# Patient Record
Sex: FEMALE | Race: WHITE | NOT HISPANIC OR LATINO | URBAN - METROPOLITAN AREA
[De-identification: names, ages, dates, MRNs, and addresses within clinical notes are randomized per-mention and may not be internally consistent; named-entity substitution may affect disease eponyms.]

---

## 2017-04-11 ENCOUNTER — OFFICE (OUTPATIENT)
Dept: URBAN - METROPOLITAN AREA CLINIC 75 | Facility: CLINIC | Age: 22
End: 2017-04-11
Payer: COMMERCIAL

## 2017-04-11 VITALS
HEIGHT: 71 IN | SYSTOLIC BLOOD PRESSURE: 112 MMHG | HEART RATE: 78 BPM | DIASTOLIC BLOOD PRESSURE: 62 MMHG | WEIGHT: 226 LBS

## 2017-04-11 DIAGNOSIS — K58.9 IRRITABLE BOWEL SYNDROME WITHOUT DIARRHEA: ICD-10-CM

## 2017-04-11 DIAGNOSIS — K21.9 GASTRO-ESOPHAGEAL REFLUX DISEASE WITHOUT ESOPHAGITIS: ICD-10-CM

## 2017-04-11 DIAGNOSIS — R11.2 NAUSEA WITH VOMITING, UNSPECIFIED: ICD-10-CM

## 2017-04-11 PROCEDURE — 99202 OFFICE O/P NEW SF 15 MIN: CPT

## 2017-05-02 VITALS
HEIGHT: 71 IN | HEART RATE: 60 BPM | TEMPERATURE: 96.8 F | HEART RATE: 72 BPM | HEART RATE: 67 BPM | HEART RATE: 62 BPM | OXYGEN SATURATION: 100 % | OXYGEN SATURATION: 99 % | SYSTOLIC BLOOD PRESSURE: 101 MMHG | SYSTOLIC BLOOD PRESSURE: 145 MMHG | TEMPERATURE: 97.8 F | DIASTOLIC BLOOD PRESSURE: 46 MMHG | SYSTOLIC BLOOD PRESSURE: 125 MMHG | SYSTOLIC BLOOD PRESSURE: 136 MMHG | DIASTOLIC BLOOD PRESSURE: 77 MMHG | WEIGHT: 226 LBS | OXYGEN SATURATION: 98 % | RESPIRATION RATE: 21 BRPM | RESPIRATION RATE: 14 BRPM | HEART RATE: 68 BPM | SYSTOLIC BLOOD PRESSURE: 105 MMHG | DIASTOLIC BLOOD PRESSURE: 85 MMHG | DIASTOLIC BLOOD PRESSURE: 72 MMHG | SYSTOLIC BLOOD PRESSURE: 132 MMHG | RESPIRATION RATE: 18 BRPM | DIASTOLIC BLOOD PRESSURE: 87 MMHG | RESPIRATION RATE: 15 BRPM

## 2017-05-03 ENCOUNTER — AMBULATORY SURGICAL CENTER (OUTPATIENT)
Dept: URBAN - METROPOLITAN AREA SURGERY 17 | Facility: SURGERY | Age: 22
End: 2017-05-03
Payer: COMMERCIAL

## 2017-05-03 ENCOUNTER — OFFICE (OUTPATIENT)
Dept: URBAN - METROPOLITAN AREA CLINIC 64 | Facility: CLINIC | Age: 22
End: 2017-05-03
Payer: COMMERCIAL

## 2017-05-03 DIAGNOSIS — K31.89 OTHER DISEASES OF STOMACH AND DUODENUM: ICD-10-CM

## 2017-05-03 DIAGNOSIS — K31.9 DISEASE OF STOMACH AND DUODENUM, UNSPECIFIED: ICD-10-CM

## 2017-05-03 DIAGNOSIS — K21.0 GASTRO-ESOPHAGEAL REFLUX DISEASE WITH ESOPHAGITIS: ICD-10-CM

## 2017-05-03 DIAGNOSIS — K29.50 UNSPECIFIED CHRONIC GASTRITIS WITHOUT BLEEDING: ICD-10-CM

## 2017-05-03 DIAGNOSIS — T18.2XXA FOREIGN BODY IN STOMACH, INITIAL ENCOUNTER: ICD-10-CM

## 2017-05-03 DIAGNOSIS — R11.2 NAUSEA WITH VOMITING, UNSPECIFIED: ICD-10-CM

## 2017-05-03 LAB
GI HISTOLOGY: A. UNSPECIFIED: (no result)
GI HISTOLOGY: B. UNSPECIFIED: (no result)
GI HISTOLOGY: PDF REPORT: (no result)

## 2017-05-03 PROCEDURE — 88342 IMHCHEM/IMCYTCHM 1ST ANTB: CPT

## 2017-05-03 PROCEDURE — 88305 TISSUE EXAM BY PATHOLOGIST: CPT

## 2017-05-03 PROCEDURE — 43239 EGD BIOPSY SINGLE/MULTIPLE: CPT

## 2017-05-03 RX ORDER — METOCLOPRAMIDE 5 MG/1
15 TABLET ORAL
Qty: 90 | Refills: 2 | Status: ACTIVE
Start: 2017-05-03

## 2017-05-03 RX ADMIN — PROPOFOL 100 MG: 10 INJECTION, EMULSION INTRAVENOUS at 08:59

## 2017-05-03 RX ADMIN — PROPOFOL 50 MG: 10 INJECTION, EMULSION INTRAVENOUS at 09:02

## 2017-05-03 RX ADMIN — PROPOFOL 50 MG: 10 INJECTION, EMULSION INTRAVENOUS at 09:00

## 2017-05-03 RX ADMIN — LIDOCAINE HYDROCHLORIDE 25 MG: 10 INJECTION, SOLUTION EPIDURAL; INFILTRATION; INTRACAUDAL; PERINEURAL at 08:59

## 2017-06-03 PROBLEM — K21.0 GASTRO-ESOPHAGEAL REFLUX DISEASE WITH ESOPHAGITIS: Status: ACTIVE | Noted: 2017-05-03

## 2018-03-06 ENCOUNTER — HOSPITAL ENCOUNTER (EMERGENCY)
Facility: HOSPITAL | Age: 23
Discharge: HOME | End: 2018-03-06
Attending: EMERGENCY MEDICINE | Admitting: EMERGENCY MEDICINE

## 2018-03-06 VITALS
DIASTOLIC BLOOD PRESSURE: 72 MMHG | SYSTOLIC BLOOD PRESSURE: 113 MMHG | HEIGHT: 71 IN | TEMPERATURE: 97.8 F | RESPIRATION RATE: 18 BRPM | OXYGEN SATURATION: 97 % | BODY MASS INDEX: 30.8 KG/M2 | HEART RATE: 86 BPM | WEIGHT: 220 LBS

## 2018-03-06 DIAGNOSIS — R11.2 NAUSEA VOMITING AND DIARRHEA: ICD-10-CM

## 2018-03-06 DIAGNOSIS — R11.2 NAUSEA AND VOMITING, INTRACTABILITY OF VOMITING NOT SPECIFIED, UNSPECIFIED VOMITING TYPE: ICD-10-CM

## 2018-03-06 DIAGNOSIS — R19.7 NAUSEA VOMITING AND DIARRHEA: ICD-10-CM

## 2018-03-06 DIAGNOSIS — R10.31 RLQ ABDOMINAL PAIN: Primary | ICD-10-CM

## 2018-03-06 LAB
ALBUMIN SERPL-MCNC: 4.7 G/DL (ref 3.5–5.2)
ALBUMIN/GLOB SERPL: 1.5 G/DL
ALP SERPL-CCNC: 77 U/L (ref 39–117)
ALT SERPL W P-5'-P-CCNC: 31 U/L (ref 1–33)
AMORPH URATE CRY URNS QL MICRO: ABNORMAL /HPF
ANION GAP SERPL CALCULATED.3IONS-SCNC: 9 MMOL/L
AST SERPL-CCNC: 19 U/L (ref 1–32)
BACTERIA UR QL AUTO: ABNORMAL /HPF
BASOPHILS # BLD AUTO: 0.04 10*3/MM3 (ref 0–0.2)
BASOPHILS NFR BLD AUTO: 0.5 % (ref 0–1.5)
BILIRUB SERPL-MCNC: 0.6 MG/DL (ref 0.1–1.2)
BILIRUB UR QL STRIP: NEGATIVE
BUN BLD-MCNC: 11 MG/DL (ref 6–20)
BUN/CREAT SERPL: 12.8 (ref 7–25)
CALCIUM SPEC-SCNC: 9.5 MG/DL (ref 8.6–10.5)
CHLORIDE SERPL-SCNC: 100 MMOL/L (ref 98–107)
CLARITY UR: ABNORMAL
CO2 SERPL-SCNC: 31 MMOL/L (ref 22–29)
COLOR UR: YELLOW
CREAT BLD-MCNC: 0.86 MG/DL (ref 0.57–1)
DEPRECATED RDW RBC AUTO: 43.4 FL (ref 37–54)
EOSINOPHIL # BLD AUTO: 0.09 10*3/MM3 (ref 0–0.7)
EOSINOPHIL NFR BLD AUTO: 1.1 % (ref 0.3–6.2)
ERYTHROCYTE [DISTWIDTH] IN BLOOD BY AUTOMATED COUNT: 12.5 % (ref 11.7–13)
GFR SERPL CREATININE-BSD FRML MDRD: 84 ML/MIN/1.73
GLOBULIN UR ELPH-MCNC: 3.2 GM/DL
GLUCOSE BLD-MCNC: 92 MG/DL (ref 65–99)
GLUCOSE UR STRIP-MCNC: NEGATIVE MG/DL
HCG SERPL QL: NEGATIVE
HCT VFR BLD AUTO: 45.5 % (ref 35.6–45.5)
HGB BLD-MCNC: 14.9 G/DL (ref 11.9–15.5)
HGB UR QL STRIP.AUTO: NEGATIVE
HYALINE CASTS UR QL AUTO: ABNORMAL /LPF
IMM GRANULOCYTES # BLD: 0 10*3/MM3 (ref 0–0.03)
IMM GRANULOCYTES NFR BLD: 0 % (ref 0–0.5)
KETONES UR QL STRIP: NEGATIVE
LEUKOCYTE ESTERASE UR QL STRIP.AUTO: ABNORMAL
LIPASE SERPL-CCNC: 19 U/L (ref 13–60)
LYMPHOCYTES # BLD AUTO: 2.85 10*3/MM3 (ref 0.9–4.8)
LYMPHOCYTES NFR BLD AUTO: 33.8 % (ref 19.6–45.3)
MCH RBC QN AUTO: 31.2 PG (ref 26.9–32)
MCHC RBC AUTO-ENTMCNC: 32.7 G/DL (ref 32.4–36.3)
MCV RBC AUTO: 95.2 FL (ref 80.5–98.2)
MONOCYTES # BLD AUTO: 0.68 10*3/MM3 (ref 0.2–1.2)
MONOCYTES NFR BLD AUTO: 8.1 % (ref 5–12)
NEUTROPHILS # BLD AUTO: 4.78 10*3/MM3 (ref 1.9–8.1)
NEUTROPHILS NFR BLD AUTO: 56.5 % (ref 42.7–76)
NITRITE UR QL STRIP: NEGATIVE
PH UR STRIP.AUTO: 6.5 [PH] (ref 5–8)
PLATELET # BLD AUTO: 214 10*3/MM3 (ref 140–500)
PMV BLD AUTO: 9.8 FL (ref 6–12)
POTASSIUM BLD-SCNC: 3.9 MMOL/L (ref 3.5–5.2)
PROT SERPL-MCNC: 7.9 G/DL (ref 6–8.5)
PROT UR QL STRIP: NEGATIVE
RBC # BLD AUTO: 4.78 10*6/MM3 (ref 3.9–5.2)
RBC # UR: ABNORMAL /HPF
REF LAB TEST METHOD: ABNORMAL
SODIUM BLD-SCNC: 140 MMOL/L (ref 136–145)
SP GR UR STRIP: 1.02 (ref 1–1.03)
SQUAMOUS #/AREA URNS HPF: ABNORMAL /HPF
TRANS CELLS #/AREA URNS HPF: ABNORMAL /HPF
UROBILINOGEN UR QL STRIP: ABNORMAL
WBC NRBC COR # BLD: 8.44 10*3/MM3 (ref 4.5–10.7)
WBC UR QL AUTO: ABNORMAL /HPF
WHOLE BLOOD HOLD SPECIMEN: NORMAL

## 2018-03-06 PROCEDURE — 80053 COMPREHEN METABOLIC PANEL: CPT | Performed by: EMERGENCY MEDICINE

## 2018-03-06 PROCEDURE — 87086 URINE CULTURE/COLONY COUNT: CPT | Performed by: EMERGENCY MEDICINE

## 2018-03-06 PROCEDURE — 85025 COMPLETE CBC W/AUTO DIFF WBC: CPT | Performed by: EMERGENCY MEDICINE

## 2018-03-06 PROCEDURE — 81001 URINALYSIS AUTO W/SCOPE: CPT | Performed by: EMERGENCY MEDICINE

## 2018-03-06 PROCEDURE — 36415 COLL VENOUS BLD VENIPUNCTURE: CPT | Performed by: EMERGENCY MEDICINE

## 2018-03-06 PROCEDURE — 84703 CHORIONIC GONADOTROPIN ASSAY: CPT | Performed by: EMERGENCY MEDICINE

## 2018-03-06 PROCEDURE — 83690 ASSAY OF LIPASE: CPT | Performed by: EMERGENCY MEDICINE

## 2018-03-06 PROCEDURE — 99283 EMERGENCY DEPT VISIT LOW MDM: CPT

## 2018-03-06 RX ORDER — FAMOTIDINE 20 MG/1
20 TABLET, FILM COATED ORAL NIGHTLY
Qty: 30 TABLET | Refills: 0 | Status: SHIPPED | OUTPATIENT
Start: 2018-03-06 | End: 2019-07-18

## 2018-03-06 RX ORDER — PROMETHAZINE HYDROCHLORIDE 25 MG/1
25 TABLET ORAL EVERY 8 HOURS PRN
Qty: 30 TABLET | Refills: 0 | Status: SHIPPED | OUTPATIENT
Start: 2018-03-06 | End: 2019-07-18

## 2018-03-06 RX ORDER — SODIUM CHLORIDE 0.9 % (FLUSH) 0.9 %
10 SYRINGE (ML) INJECTION AS NEEDED
Status: DISCONTINUED | OUTPATIENT
Start: 2018-03-06 | End: 2018-03-06 | Stop reason: HOSPADM

## 2018-03-06 NOTE — ED TRIAGE NOTES
Pt reports she has been throwing up for a month.  Pt was seen at urgent care and was diagnosed with a virus.  Pt complains of n/v/d. Pt reports rlq pain that started last week.  Pt denies urinary issues. Pt reports chills but denies fever.  Pt was given phenergan but is out.

## 2018-03-07 NOTE — ED PROVIDER NOTES
EMERGENCY DEPARTMENT ENCOUNTER    CHIEF COMPLAINT  Chief Complaint: N/V/D, abd pain  History given by: patient, family  History limited by: nothing  Room Number: 35/35  PMD: No Known Provider      HPI:  Pt is a 22 y.o. female who presents complaining of N/V/D for the last 2-4 weeks, which has become worse over the last several days. Pt states that she has not been able to drink fluids or eat foods due to her N/V. Pt also complains of right sided abd pain for the last week and chills but denies fever, urinary symptoms, recent travel, recent bad food exposure, recent abx use or sick exposure. Pt states that she has been taking Phenergan with moderate relief of her nausea.    Duration:  2-4 weeks  Onset: gradual  Timing: waxing and waning  Quality: N/V/D  Intensity/Severity: moderate  Progression: worsening  Associated Symptoms: right sided abd pain, chills  Aggravating Factors: eating food, drinking fluids  Alleviating Factors: none  Treatment before arrival: Pt states that she has been taking Phenergan with moderate relief of her nausea.    PAST MEDICAL HISTORY  Active Ambulatory Problems     Diagnosis Date Noted   • No Active Ambulatory Problems     Resolved Ambulatory Problems     Diagnosis Date Noted   • No Resolved Ambulatory Problems     Past Medical History:   Diagnosis Date   • PCOS (polycystic ovarian syndrome)        PAST SURGICAL HISTORY  Past Surgical History:   Procedure Laterality Date   • CHOLECYSTECTOMY         FAMILY HISTORY  Family History   Problem Relation Age of Onset   • Hypertension Mother    • Diabetes Father    • Diabetes Maternal Grandmother    • Hypertension Maternal Grandmother    • Diabetes Maternal Grandfather    • Hypertension Maternal Grandfather        SOCIAL HISTORY  Social History     Social History   • Marital status: Single     Spouse name: N/A   • Number of children: N/A   • Years of education: N/A     Occupational History   • Not on file.     Social History Main Topics   •  Smoking status: Current Every Day Smoker     Packs/day: 0.50   • Smokeless tobacco: Never Used   • Alcohol use No   • Drug use: No   • Sexual activity: Yes     Partners: Male     Birth control/ protection: None     Other Topics Concern   • Not on file     Social History Narrative       ALLERGIES  Penicillins    REVIEW OF SYSTEMS  Review of Systems   Constitutional: Positive for chills. Negative for fever.   HENT: Negative for sore throat.    Eyes: Negative.    Respiratory: Negative for cough and shortness of breath.    Cardiovascular: Negative for chest pain.   Gastrointestinal: Positive for abdominal pain (right sided), diarrhea, nausea and vomiting.   Genitourinary: Negative for dysuria.   Musculoskeletal: Negative for neck pain.   Skin: Negative for rash.   Allergic/Immunologic: Negative.    Neurological: Negative for weakness, numbness and headaches.   Hematological: Negative.    Psychiatric/Behavioral: Negative.    All other systems reviewed and are negative.      PHYSICAL EXAM  ED Triage Vitals   Temp Heart Rate Resp BP SpO2   03/06/18 1752 03/06/18 1752 03/06/18 1752 03/06/18 1757 03/06/18 1752   97.8 °F (36.6 °C) 82 16 147/94 99 %      Temp src Heart Rate Source Patient Position BP Location FiO2 (%)   03/06/18 1752 03/06/18 1752 03/06/18 1757 03/06/18 1757 --   Tympanic Monitor Sitting Left arm        Physical Exam   Constitutional: She is oriented to person, place, and time and well-developed, well-nourished, and in no distress. No distress.   HENT:   Head: Normocephalic.   Eyes: EOM are normal. Pupils are equal, round, and reactive to light.   Neck: Normal range of motion. Neck supple.   Cardiovascular: Normal rate, regular rhythm and normal heart sounds.    Pulmonary/Chest: Effort normal and breath sounds normal. No respiratory distress.   Abdominal: Soft. There is tenderness in the right lower quadrant. There is no rebound, no guarding and no CVA tenderness.   Musculoskeletal: Normal range of motion. She  exhibits no edema.   Neurological: She is alert and oriented to person, place, and time. She has normal sensation and normal strength.   Skin: Skin is warm and dry. No rash noted.   Nursing note and vitals reviewed.      LAB RESULTS  Lab Results (last 24 hours)     Procedure Component Value Units Date/Time    CBC & Differential [[id number]] Collected:  03/06/18 1810    Specimen:  Blood Updated:  03/06/18 1824    Narrative:       The following orders were created for panel order CBC & Differential.  Procedure                               Abnormality         Status                     ---------                               -----------         ------                     CBC Auto Differential[[id number]]        Normal              Final result                 Please view results for these tests on the individual orders.    Comprehensive Metabolic Panel [[id number]]  (Abnormal) Collected:  03/06/18 1810    Specimen:  Blood Updated:  03/06/18 1843     Glucose 92 mg/dL      BUN 11 mg/dL      Creatinine 0.86 mg/dL      Sodium 140 mmol/L      Potassium 3.9 mmol/L      Chloride 100 mmol/L      CO2 31.0 (H) mmol/L      Calcium 9.5 mg/dL      Total Protein 7.9 g/dL      Albumin 4.70 g/dL      ALT (SGPT) 31 U/L      AST (SGOT) 19 U/L      Alkaline Phosphatase 77 U/L      Total Bilirubin 0.6 mg/dL      eGFR Non African Amer 84 mL/min/1.73      Globulin 3.2 gm/dL      A/G Ratio 1.5 g/dL      BUN/Creatinine Ratio 12.8     Anion Gap 9.0 mmol/L     Lipase [[id number]]  (Normal) Collected:  03/06/18 1810    Specimen:  Blood Updated:  03/06/18 1843     Lipase 19 U/L     Urinalysis With / Culture If Indicated - Urine, Clean Catch [[id number]]  (Abnormal) Collected:  03/06/18 1810    Specimen:  Urine from Urine, Clean Catch Updated:  03/06/18 1826     Color, UA Yellow     Appearance, UA Cloudy (A)     pH, UA 6.5     Specific Gravity, UA 1.022     Glucose, UA Negative     Ketones, UA Negative     Bilirubin, UA Negative     Blood, UA Negative      Protein, UA Negative     Leuk Esterase, UA Large (3+) (A)     Nitrite, UA Negative     Urobilinogen, UA 0.2 E.U./dL    hCG, Serum, Qualitative [[id number]]  (Normal) Collected:  03/06/18 1810    Specimen:  Blood Updated:  03/06/18 1854     HCG Qualitative Negative    CBC Auto Differential [[id number]]  (Normal) Collected:  03/06/18 1810    Specimen:  Blood Updated:  03/06/18 1824     WBC 8.44 10*3/mm3      RBC 4.78 10*6/mm3      Hemoglobin 14.9 g/dL      Hematocrit 45.5 %      MCV 95.2 fL      MCH 31.2 pg      MCHC 32.7 g/dL      RDW 12.5 %      RDW-SD 43.4 fl      MPV 9.8 fL      Platelets 214 10*3/mm3      Neutrophil % 56.5 %      Lymphocyte % 33.8 %      Monocyte % 8.1 %      Eosinophil % 1.1 %      Basophil % 0.5 %      Immature Grans % 0.0 %      Neutrophils, Absolute 4.78 10*3/mm3      Lymphocytes, Absolute 2.85 10*3/mm3      Monocytes, Absolute 0.68 10*3/mm3      Eosinophils, Absolute 0.09 10*3/mm3      Basophils, Absolute 0.04 10*3/mm3      Immature Grans, Absolute 0.00 10*3/mm3     Urinalysis, Microscopic Only - Urine, Clean Catch [[id number]]  (Abnormal) Collected:  03/06/18 1810    Specimen:  Urine from Urine, Clean Catch Updated:  03/06/18 1907     RBC, UA 0-2 /HPF      WBC, UA 3-5 (A) /HPF      Bacteria, UA Trace (A) /HPF      Squamous Epithelial Cells, UA 21-30 (A) /HPF      Transitional Epithelial Cells, UA 0-2 /HPF      Hyaline Casts, UA 0-2 /LPF      Amorphous Crystals, UA Moderate/2+ /HPF      Methodology Manual Light Microscopy    Urine Culture - Urine, Urine, Clean Catch [[id number]] Collected:  03/06/18 1810    Specimen:  Urine from Urine, Clean Catch Updated:  03/06/18 1824          I ordered the above labs and reviewed the results    PROCEDURES  Procedures      PROGRESS AND CONSULTS  ED Course     2001- Notified pt and family of the pt's lab results, including the pt's contaminated UA. D/w pt and family that the pt does not likely have appendicitis since her pain has been occuring for over one  week, she has a normal WBC count and she is afebrile and that there is no indication for a CT abd/pelvis at this time. Notified pt and family that there are multiple possible etiologies for her symptoms, including IBS, inflammatory bowel disease and ovarian cyst, and that she needs to follow up as an outpatient for further evaluation of her symptoms. Discussed the plan to discharge the pt home with referrals for a PMD and prescriptions for phenergan and Pepcid. Pt understands and agrees with the plan, all questions answered.    MEDICAL DECISION MAKING  Results were reviewed/discussed with the patient and they were also made aware of online access. Pt also made aware that some labs, such as cultures, will not be resulted during ER visit and follow up with PMD is necessary.     MDM  Number of Diagnoses or Management Options  Nausea vomiting and diarrhea:   RLQ abdominal pain:      Amount and/or Complexity of Data Reviewed  Clinical lab tests: ordered and reviewed (WBC=8.44)  Decide to obtain previous medical records or to obtain history from someone other than the patient: yes  Obtain history from someone other than the patient: yes (family)  Review and summarize past medical records: yes (Pt was last seen at  on 2/16/18 for N/V. Pt was discharged home with a prescription for phenergan. Pt was seen at  on 1/30/18 and in November 2017 for acute cystitis. Pt's urine cultures from both visits grew mixed julieta.)    Patient Progress  Patient progress: stable         DIAGNOSIS  Final diagnoses:   RLQ abdominal pain   Nausea vomiting and diarrhea       DISPOSITION  DISCHARGE    Patient discharged in stable condition.    Reviewed implications of results, diagnosis, meds, responsibility to follow up, warning signs and symptoms of possible worsening, potential complications and reasons to return to ER, including fever, worsening pain or any concerns.    Patient/Family voiced understanding of above instructions.    Discussed  plan for discharge, as there is no emergent indication for admission.  Pt/family is agreeable and understands need for follow up and repeat testing.  Pt is aware that discharge does not mean that nothing is wrong but it indicates no emergency is present that requires admission and they must continue care with follow-up as given below or physician of their choice.     FOLLOW-UP  [facility]  [facility] [address]  [phone]  Schedule an appointment as soon as possible for a visit           Medication List      New Prescriptions          famotidine 20 MG tablet   Commonly known as:  PEPCID   Take 1 tablet by mouth Every Night.         Changed          promethazine 25 MG tablet   Commonly known as:  PHENERGAN   Take 1 tablet by mouth Every 8 (Eight) Hours As Needed for Nausea or   Vomiting.   What changed:  Another medication with the same name was removed. Continue   taking this medication, and follow the directions you see here.         Stop          cefuroxime 500 MG tablet   Commonly known as:  CEFTIN       ciprofloxacin 500 MG tablet   Commonly known as:  CIPRO       omeprazole 40 MG capsule   Commonly known as:  priLOSEC       phenazopyridine 100 MG tablet   Commonly known as:  PYRIDIUM       sulfamethoxazole-trimethoprim 800-160 MG per tablet   Commonly known as:  BACTRIM DS,SEPTRA DS       UNKNOWN TO PATIENT               Latest Documented Vital Signs:  As of 8:25 PM  BP- 113/72 HR- 86 Temp- 97.8 °F (36.6 °C) (Tympanic) O2 sat- 97%    --  Documentation assistance provided by grupo Mitchell for Dr. Bryan.  Information recorded by the udayibe was done at my direction and has been verified and validated by me.     Marnie Mitchell  03/06/18 2025       Critso Bryan MD  03/07/18 0041

## 2018-03-08 LAB
BACTERIA SPEC AEROBE CULT: NORMAL
BACTERIA SPEC AEROBE CULT: NORMAL

## 2019-07-18 ENCOUNTER — OFFICE VISIT (OUTPATIENT)
Dept: OBSTETRICS AND GYNECOLOGY | Age: 24
End: 2019-07-18

## 2019-07-18 VITALS
SYSTOLIC BLOOD PRESSURE: 120 MMHG | DIASTOLIC BLOOD PRESSURE: 82 MMHG | BODY MASS INDEX: 30.61 KG/M2 | WEIGHT: 226 LBS | HEIGHT: 72 IN

## 2019-07-18 DIAGNOSIS — Z13.89 SCREENING FOR BLOOD OR PROTEIN IN URINE: ICD-10-CM

## 2019-07-18 DIAGNOSIS — Z13.9 SPECIAL SCREENING: ICD-10-CM

## 2019-07-18 DIAGNOSIS — Z01.419 WELL WOMAN EXAM WITH ROUTINE GYNECOLOGICAL EXAM: Primary | ICD-10-CM

## 2019-07-18 LAB
B-HCG UR QL: NEGATIVE
BILIRUB BLD-MCNC: NEGATIVE MG/DL
CLARITY, POC: CLEAR
COLOR UR: YELLOW
GLUCOSE UR STRIP-MCNC: NEGATIVE MG/DL
INTERNAL NEGATIVE CONTROL: NEGATIVE
INTERNAL POSITIVE CONTROL: POSITIVE
KETONES UR QL: NEGATIVE
LEUKOCYTE EST, POC: NEGATIVE
Lab: NORMAL
NITRITE UR-MCNC: NEGATIVE MG/ML
PH UR: 7 [PH] (ref 5–8)
PROT UR STRIP-MCNC: NEGATIVE MG/DL
RBC # UR STRIP: ABNORMAL /UL
SP GR UR: 1.02 (ref 1–1.03)
UROBILINOGEN UR QL: NORMAL

## 2019-07-18 PROCEDURE — 81002 URINALYSIS NONAUTO W/O SCOPE: CPT | Performed by: NURSE PRACTITIONER

## 2019-07-18 PROCEDURE — 81025 URINE PREGNANCY TEST: CPT | Performed by: NURSE PRACTITIONER

## 2019-07-18 PROCEDURE — 99385 PREV VISIT NEW AGE 18-39: CPT | Performed by: NURSE PRACTITIONER

## 2019-07-18 RX ORDER — VILAZODONE HYDROCHLORIDE 20 MG/1
20 TABLET ORAL DAILY
Refills: 1 | COMMUNITY
Start: 2019-06-05 | End: 2020-06-08

## 2019-07-18 RX ORDER — LAMOTRIGINE 200 MG/1
1 TABLET, EXTENDED RELEASE ORAL DAILY
Refills: 1 | COMMUNITY
Start: 2019-06-05 | End: 2020-06-08

## 2019-07-18 NOTE — PROGRESS NOTES
[facility]  Routine Annual Visit    7/18/2019    Patient: Trista Lim          MR#:[id number]      History of Present Illness    23 y.o. female No obstetric history on file. who presents for annual exam as a new patient with complaint of painful periods. She had a particularly heavy and painful period last month but resolved and voices no complaints today. This is her first visit to a GYN and her first pap smear. She is not on contraception and is sexually active. She uses condoms strictly. She was on depo injections for about a year last year but discontinued due to side effects of weight gain and abnormal bleeding. She is on vibryd and lamictal for bipolar, depression, anxiety- prescribed by a physician in [place] Dr. Salamanca who she continues to see. She reports she has done lots of therapy in the past but is not currently. She states her moods have been well controlled on her current therapy.      Patient's last menstrual period was 06/26/2019.  Obstetric History:  OB History     No data available         Menstrual History:     Patient's last menstrual period was 06/26/2019.       Sexual History:       ________________________________________  There is no problem list on file for this patient.      Past Medical History:   Diagnosis Date   • PCOS (polycystic ovarian syndrome)        Past Surgical History:   Procedure Laterality Date   • CHOLECYSTECTOMY         Social History     Tobacco Use   Smoking Status Current Every Day Smoker   • Packs/day: 0.50   Smokeless Tobacco Never Used       Family History   Problem Relation Age of Onset   • Hypertension Mother    • Diabetes Father    • Diabetes Maternal Grandmother    • Hypertension Maternal Grandmother    • Diabetes Maternal Grandfather    • Hypertension Maternal Grandfather        Prior to Admission medications    Medication Sig Start Date End Date Taking? Authorizing Provider   L-methylfolate Calcium 15 MG tablet  4/11/19  Yes Provider, MD Jona    lamoTRIgine  MG tablet sustained-release 24 hour Take 1 tablet by mouth Daily. 6/5/19  Yes Provider, Jona, MD   VIIBRYD 20 MG tablet tablet Take 20 mg by mouth Daily. 6/5/19  Yes Provider, Jona, MD   cefuroxime (CEFTIN) 500 MG tablet TK 1 T PO  BID FOR 5 DAYS 8/27/17   Emergency, Nurse Brent RN   ciprofloxacin (CIPRO) 500 MG tablet Take 1 tablet by mouth 2 (Two) Times a Day. 11/1/17   Gabriela Ramirez PA   famotidine (PEPCID) 20 MG tablet Take 1 tablet by mouth Every Night. 3/6/18   Cristo Bryan MD   omeprazole (priLOSEC) 40 MG capsule Take 40 mg by mouth Daily.    Emergency, Nurse Epic, RN   phenazopyridine (PYRIDIUM) 100 MG tablet TK 1 T PO  TID WITH MEALS 8/27/17   Emergency, Nurse ALEJANDRO Kennedy   promethazine (PHENERGAN) 25 MG tablet Take 1 tablet by mouth Every 6 (Six) Hours As Needed for Nausea or Vomiting. 2/16/18   Makenzie Morgan APRN   promethazine (PHENERGAN) 25 MG tablet Take 1 tablet by mouth Every 8 (Eight) Hours As Needed for Nausea or Vomiting. 3/6/18   Cristo Bryan MD   sulfamethoxazole-trimethoprim (BACTRIM DS,SEPTRA DS) 800-160 MG per tablet Take 1 tablet by mouth 2 (Two) Times a Day. 1/30/18   Jessica España PA-C   UNKNOWN TO PATIENT Medication r/t poor digestion.    Emergency, Nurse ALEJANDRO Kennedy     The following portions of the patient's history were reviewed and updated as appropriate: allergies, current medications, past family history, past medical history, past social history, past surgical history and problem list.    Review of Systems   Constitutional: Negative.    HENT: Negative.    Eyes: Negative for visual disturbance.   Respiratory: Negative for cough, shortness of breath and wheezing.    Cardiovascular: Negative for chest pain, palpitations and leg swelling.   Gastrointestinal: Negative for abdominal distention, abdominal pain, blood in stool, constipation, diarrhea, nausea and vomiting.   Endocrine: Negative for cold intolerance and heat intolerance.  "  Genitourinary: Positive for menstrual problem. Negative for difficulty urinating, dyspareunia, dysuria, frequency, genital sores, hematuria, pelvic pain, urgency, vaginal bleeding, vaginal discharge and vaginal pain.   Musculoskeletal: Negative.    Skin: Negative.    Neurological: Negative for dizziness, weakness, light-headedness, numbness and headaches.   Hematological: Negative.    Psychiatric/Behavioral: Negative.    Breasts: negative for lumps skin changes, dimpling, swelling, nipple changes/discharge bilaterally         Objective   Physical Exam    /82   Ht 182.9 cm (72\")   Wt 103 kg (226 lb)   LMP 06/26/2019   BMI 30.65 kg/m²    BP Readings from Last 3 Encounters:   07/18/19 120/82   03/06/18 113/72   02/16/18 134/68      Wt Readings from Last 3 Encounters:   07/18/19 103 kg (226 lb)   03/06/18 99.8 kg (220 lb)   02/16/18 99.8 kg (220 lb)        BMI: Estimated body mass index is 30.65 kg/m² as calculated from the following:    Height as of this encounter: 182.9 cm (72\").    Weight as of this encounter: 103 kg (226 lb).        General:   alert, appears stated age and cooperative   Heart: regular rate and rhythm, S1, S2 normal, no murmur, click, rub or gallop   Lungs: clear to auscultation bilaterally   Abdomen: soft, non-tender, without masses or organomegaly   Breast: inspection negative, no nipple discharge or bleeding, no masses or nodularity palpable   Vulva: normal   Vagina: normal mucosa, normal discharge   Cervix: no cervical motion tenderness and no lesions   Uterus: normal size, mobile, non-tender or normal shape and consistency   Adnexa: no mass, fullness, tenderness     Assessment:    1. Normal annual exam   2. Long discussion regarding contraception- shabbir is most interested in OCPs and IUD. We discussed the potential interaction with OCPs and lamictal - possible lower efficacy of lamictal. For this reason I recommend an IUD. Information on kyleena given to Shabbir. She wishes to " consider her options and call me with her decision. She will continue strict condom use for now.  3.  Counseled on healthy lifestyle modifications, safe sex, condom use, and self breast exams.      Plan:    []  Rx:   []  Mammogram request made  []  PAP done  []  Occult fecal blood test (Insure)  []  Labs:   []  GC/Chl/TV  []  DEXA scan   []  Referral for colonoscopy:           PATO Augustine  7/18/2019 8:43 AM

## 2019-07-20 LAB
C TRACH RRNA SPEC QL NAA+PROBE: NEGATIVE
N GONORRHOEA RRNA SPEC QL NAA+PROBE: NEGATIVE
T VAGINALIS RRNA VAG QL NAA+PROBE: NEGATIVE

## 2019-07-22 ENCOUNTER — TELEPHONE (OUTPATIENT)
Dept: OBSTETRICS AND GYNECOLOGY | Age: 24
End: 2019-07-22

## 2019-07-22 LAB
CONV .: NORMAL
CYTOLOGIST CVX/VAG CYTO: NORMAL
CYTOLOGY CVX/VAG DOC CYTO: NORMAL
CYTOLOGY CVX/VAG DOC THIN PREP: NORMAL
DX ICD CODE: NORMAL
HIV 1 & 2 AB SER-IMP: NORMAL
OTHER STN SPEC: NORMAL
STAT OF ADQ CVX/VAG CYTO-IMP: NORMAL

## 2019-07-22 NOTE — TELEPHONE ENCOUNTER
----- Message from PATO Peacock sent at 7/22/2019  1:11 PM EDT -----  Please inform patient her pap smear returned negative (normal). Thank you.

## 2019-08-22 ENCOUNTER — OFFICE VISIT (OUTPATIENT)
Dept: OBSTETRICS AND GYNECOLOGY | Age: 24
End: 2019-08-22

## 2019-08-22 VITALS
WEIGHT: 228 LBS | DIASTOLIC BLOOD PRESSURE: 80 MMHG | HEIGHT: 72 IN | BODY MASS INDEX: 30.88 KG/M2 | SYSTOLIC BLOOD PRESSURE: 120 MMHG

## 2019-08-22 DIAGNOSIS — N92.6 IRREGULAR MENSES: Primary | ICD-10-CM

## 2019-08-22 LAB
B-HCG UR QL: NEGATIVE
INTERNAL NEGATIVE CONTROL: NEGATIVE
INTERNAL POSITIVE CONTROL: POSITIVE
Lab: NORMAL

## 2019-08-22 PROCEDURE — 99213 OFFICE O/P EST LOW 20 MIN: CPT | Performed by: NURSE PRACTITIONER

## 2019-08-22 PROCEDURE — 81025 URINE PREGNANCY TEST: CPT | Performed by: NURSE PRACTITIONER

## 2019-08-22 NOTE — PROGRESS NOTES
[facility]  Routine Annual Visit    2019    Patient: Trista Lim          MR#:[id number]      History of Present Illness    24 y.o. female  who presents with complaint of irregular periods x 3-6 months. She reports periods are anywhere from every 2-6 weeks- here lately they have been closer today. Denies double protection, pelvic pain. We discussed IUD for contraception and cycle control at her annual exam last month and she would like to move forward with getting this device. Pap and STIs negative last month- denies new partner or need for repeat testing.     Patient's last menstrual period was 2019.  Obstetric History:  OB History      Para Term  AB Living    0 0 0 0 0 0    SAB TAB Ectopic Molar Multiple Live Births    0 0 0 0 0 0         Menstrual History:     Patient's last menstrual period was 2019.       Sexual History:       ________________________________________  There is no problem list on file for this patient.      Past Medical History:   Diagnosis Date   • Anxiety    • Bipolar disorder (CMS/HCC)    • Depression    • PCOS (polycystic ovarian syndrome)        Past Surgical History:   Procedure Laterality Date   • CHOLECYSTECTOMY         Social History     Tobacco Use   Smoking Status Current Every Day Smoker   • Packs/day: 0.50   Smokeless Tobacco Never Used       Family History   Problem Relation Age of Onset   • Hypertension Mother    • Diabetes Father    • Diabetes Maternal Grandmother    • Hypertension Maternal Grandmother    • Diabetes Maternal Grandfather    • Hypertension Maternal Grandfather        Prior to Admission medications    Medication Sig Start Date End Date Taking? Authorizing Provider   L-methylfolate Calcium 15 MG tablet  19  Yes ProviderJona MD   lamoTRIgine  MG tablet sustained-release 24 hour Take 1 tablet by mouth Daily. 19  Yes Jona Donnelly MD   UNKNOWN TO PATIENT Medication r/t poor digestion.   " Yes Emergency, Nurse Epic, RN   VIIBRYD 20 MG tablet tablet Take 20 mg by mouth Daily. 6/5/19  Yes Provider, MD Jona   omeprazole (priLOSEC) 40 MG capsule Take 40 mg by mouth Daily.  8/22/19  Emergency, Nurse ALEJANDRO Kennedy       The following portions of the patient's history were reviewed and updated as appropriate: allergies, current medications, past family history, past medical history, past social history, past surgical history and problem list.    Review of Systems   Constitutional: Negative for chills, fatigue and fever.   Gastrointestinal: Negative for abdominal distention and abdominal pain.   Genitourinary: Positive for menstrual problem. Negative for decreased urine volume, difficulty urinating, dyspareunia, dysuria, flank pain, frequency, genital sores, hematuria, pelvic pain, urgency, vaginal bleeding, vaginal discharge and vaginal pain.       Objective   Physical Exam   Constitutional: She is oriented to person, place, and time. She appears well-developed and well-nourished. No distress.   Abdominal: Soft. Bowel sounds are normal.   Neurological: She is alert and oriented to person, place, and time.   Skin: Skin is warm and dry.   Psychiatric: She has a normal mood and affect. Her behavior is normal.       /80   Ht 182.9 cm (72\")   Wt 103 kg (228 lb)   LMP 08/12/2019   BMI 30.92 kg/m²    BP Readings from Last 3 Encounters:   08/22/19 120/80   07/18/19 120/82   03/06/18 113/72      Wt Readings from Last 3 Encounters:   08/22/19 103 kg (228 lb)   07/18/19 103 kg (226 lb)   03/06/18 99.8 kg (220 lb)        BMI: Estimated body mass index is 30.92 kg/m² as calculated from the following:    Height as of this encounter: 182.9 cm (72\").    Weight as of this encounter: 103 kg (228 lb).      Assessment:    1. Irregular menses- labs today; return for TV US tomorrow; return for kyleena insertion.     Plan:    []  Rx:   []  Mammogram request made  []  PAP done  []  Occult fecal blood test (Insure)  []  " Labs:   []  GC/Chl/TV  []  DEXA scan   []  Referral for colonoscopy:           Vivienne Duvall, PATO  8/22/2019 2:36 PM

## 2019-08-23 ENCOUNTER — TELEPHONE (OUTPATIENT)
Dept: OBSTETRICS AND GYNECOLOGY | Age: 24
End: 2019-08-23

## 2019-08-23 LAB
ERYTHROCYTE [DISTWIDTH] IN BLOOD BY AUTOMATED COUNT: 13 % (ref 12.3–15.4)
HCT VFR BLD AUTO: 40.3 % (ref 34–46.6)
HGB BLD-MCNC: 13.6 G/DL (ref 11.1–15.9)
MCH RBC QN AUTO: 31.1 PG (ref 26.6–33)
MCHC RBC AUTO-ENTMCNC: 33.7 G/DL (ref 31.5–35.7)
MCV RBC AUTO: 92 FL (ref 79–97)
PLATELET # BLD AUTO: 226 X10E3/UL (ref 150–450)
PROLACTIN SERPL-MCNC: 14.2 NG/ML (ref 4.8–23.3)
RBC # BLD AUTO: 4.38 X10E6/UL (ref 3.77–5.28)
TSH SERPL DL<=0.005 MIU/L-ACNC: 1.66 UIU/ML (ref 0.45–4.5)
WBC # BLD AUTO: 9.2 X10E3/UL (ref 3.4–10.8)

## 2019-08-23 NOTE — TELEPHONE ENCOUNTER
----- Message from PATO Peacock sent at 8/23/2019  9:22 AM EDT -----  Please inform patient her blood work from yesterday returned WNL.

## 2019-09-20 ENCOUNTER — TELEPHONE (OUTPATIENT)
Dept: OBSTETRICS AND GYNECOLOGY | Age: 24
End: 2019-09-20

## 2019-09-20 NOTE — TELEPHONE ENCOUNTER
Called pt to [facility] appt for Kyleena insertion. States she no longer wants the kyleena now and will call back at a different time to discuss other options

## 2020-05-07 PROBLEM — R63.0 LOSS OF APPETITE: Status: ACTIVE | Noted: 2020-05-07

## 2020-05-07 PROBLEM — H66.90 ACUTE OTITIS MEDIA: Status: ACTIVE | Noted: 2020-05-07

## 2020-05-07 PROBLEM — R43.2 LOSS OF TASTE: Status: ACTIVE | Noted: 2020-05-07

## 2020-05-07 PROBLEM — Z20.822 SUSPECTED COVID-19 VIRUS INFECTION: Status: ACTIVE | Noted: 2020-05-07

## 2020-05-07 PROBLEM — J02.9 SORE THROAT: Status: ACTIVE | Noted: 2020-05-07

## 2020-05-08 ENCOUNTER — TELEPHONE (OUTPATIENT)
Dept: URGENT CARE | Facility: CLINIC | Age: 25
End: 2020-05-08

## 2020-05-08 NOTE — TELEPHONE ENCOUNTER
5/8/2020 mother calls [facility].   Complaints that I  Placed her daughter in quarantine  secondary to suspected COVID-19.  Mother informed me that her daughter did not have COVID because she had no fever.  Daughter presented with complaints of sore throat, right ear pain, right eye crusty, loss of appetite, loss of taste, with nausea nausea. She informed me those where strep like symptoms I advised we covered her for possible strep.    I advised. She had COVID-like symptoms and was advised that if she was afebrile for 3 days she may return to work she had to be afebrile without the use of Motrin or Tylenol.  But at that time she may return to work.    Mother informed me that I did not know what I was doing that she had looked it up and her daughter had no fever therefore she had no COVID and she was sick of people with this COVID stuff. I ask if her daughter was there and she advised me the Daughter was not there.

## 2020-06-08 ENCOUNTER — PROCEDURE VISIT (OUTPATIENT)
Dept: OBSTETRICS AND GYNECOLOGY | Facility: CLINIC | Age: 25
End: 2020-06-08

## 2020-06-08 ENCOUNTER — OFFICE VISIT (OUTPATIENT)
Dept: OBSTETRICS AND GYNECOLOGY | Facility: CLINIC | Age: 25
End: 2020-06-08

## 2020-06-08 VITALS
WEIGHT: 232.3 LBS | DIASTOLIC BLOOD PRESSURE: 60 MMHG | HEIGHT: 66 IN | SYSTOLIC BLOOD PRESSURE: 100 MMHG | BODY MASS INDEX: 37.33 KG/M2

## 2020-06-08 DIAGNOSIS — R63.8 INCREASED BMI: ICD-10-CM

## 2020-06-08 DIAGNOSIS — O21.9 NAUSEA AND VOMITING IN PREGNANCY PRIOR TO 22 WEEKS GESTATION: ICD-10-CM

## 2020-06-08 DIAGNOSIS — F41.9 ANXIETY: ICD-10-CM

## 2020-06-08 DIAGNOSIS — O36.80X0 ENCOUNTER TO DETERMINE FETAL VIABILITY OF PREGNANCY, SINGLE OR UNSPECIFIED FETUS: Primary | ICD-10-CM

## 2020-06-08 DIAGNOSIS — Z32.01 PREGNANCY CONFIRMED BY POSITIVE URINE TEST: Primary | ICD-10-CM

## 2020-06-08 DIAGNOSIS — Z13.9 SCREENING FOR CONDITION: ICD-10-CM

## 2020-06-08 DIAGNOSIS — F31.9 BIPOLAR AFFECTIVE DISORDER, REMISSION STATUS UNSPECIFIED (HCC): ICD-10-CM

## 2020-06-08 LAB
B-HCG UR QL: POSITIVE
BILIRUB BLD-MCNC: NEGATIVE MG/DL
CLARITY, POC: CLEAR
COLOR UR: YELLOW
GLUCOSE UR STRIP-MCNC: NEGATIVE MG/DL
INTERNAL NEGATIVE CONTROL: NEGATIVE
INTERNAL POSITIVE CONTROL: POSITIVE
KETONES UR QL: NEGATIVE
LEUKOCYTE EST, POC: NEGATIVE
Lab: 55
NITRITE UR-MCNC: NEGATIVE MG/ML
PH UR: 5 [PH] (ref 5–8)
PROT UR STRIP-MCNC: NEGATIVE MG/DL
RBC # UR STRIP: NEGATIVE /UL
SP GR UR: 1 (ref 1–1.03)
UROBILINOGEN UR QL: NORMAL

## 2020-06-08 PROCEDURE — 81025 URINE PREGNANCY TEST: CPT | Performed by: NURSE PRACTITIONER

## 2020-06-08 PROCEDURE — 76817 TRANSVAGINAL US OBSTETRIC: CPT | Performed by: NURSE PRACTITIONER

## 2020-06-08 PROCEDURE — 99214 OFFICE O/P EST MOD 30 MIN: CPT | Performed by: NURSE PRACTITIONER

## 2020-06-08 PROCEDURE — 81002 URINALYSIS NONAUTO W/O SCOPE: CPT | Performed by: NURSE PRACTITIONER

## 2020-06-08 RX ORDER — BUSPIRONE HYDROCHLORIDE 7.5 MG/1
7.5 TABLET ORAL 2 TIMES DAILY
Qty: 60 TABLET | Refills: 1 | Status: SHIPPED | OUTPATIENT
Start: 2020-06-08 | End: 2020-09-10

## 2020-06-08 RX ORDER — ONDANSETRON 4 MG/1
4 TABLET, FILM COATED ORAL EVERY 8 HOURS PRN
Qty: 30 TABLET | Refills: 2 | Status: SHIPPED | OUTPATIENT
Start: 2020-06-08 | End: 2020-09-10

## 2020-06-08 RX ORDER — DIPHENHYDRAMINE HYDROCHLORIDE 25 MG/1
25 CAPSULE ORAL NIGHTLY
Qty: 30 TABLET | Refills: 0 | Status: SHIPPED | OUTPATIENT
Start: 2020-06-08 | End: 2020-10-05 | Stop reason: SDUPTHER

## 2020-06-08 NOTE — PROGRESS NOTES
Confirmation of pregnancy     Chief Complaint   Patient presents with   • Confirmation     LMP 2020         Trista Lim is being seen today for evaluation of absence of menses. She is a 24 y.o. . She had a + UPT at home. This is an unplanned pregnancy for her. This problem is new to me, the examiner.     LNMP: 3/28/20  Confident with date: No  Taking prenatal vitamins: Yes. Needs RX: No  Planned pregnancy: No  Prior obstetric issues, potential pregnancy concerns: first pregnancy   Family history of genetic issues (includes FOB): denies  Prior infections concerning in pregnancy (Rash, fever in last 2 weeks): denies  Varicella Hx: vaccine as child  Flu vaccine: declines   History of STDs: Denies HSV   Current medications: PNV. She stopped taking Lamictal 200mg, Vybrid 20mg and Levomefolate Glucosamine 400mcg with pregnancy (managed by PCP in [place])  Last pap smear: , reports normal   Smoker: Yes  Drug or alcohol abuse: Yes, smokes THC daily   Prior testing for Cystic Fibrosis Carrier or Sickle Cell Trait- no  Prepregnancy BMI - Body mass index is 37.49 kg/m².    Past Medical History:   Diagnosis Date   • ADD (attention deficit disorder)    • Anxiety    • Bipolar disorder (CMS/HCC)    • Depression    • GERD (gastroesophageal reflux disease)    • PCOS (polycystic ovarian syndrome)        Past Surgical History:   Procedure Laterality Date   • CHOLECYSTECTOMY           Current Outpatient Medications:   •  azithromycin (ZITHROMAX) 250 MG tablet, Take 2 tablets the first day, then 1 tablet daily for 4 days., Disp: 4 tablet, Rfl: 0  •  esomeprazole (nexIUM) 20 MG capsule, Take 60 mg by mouth Every Morning Before Breakfast., Disp: , Rfl:   •  lamoTRIgine  MG tablet sustained-release 24 hour, Take 1 tablet by mouth Daily., Disp: , Rfl: 1  •  Levomefolate Glucosamine 400 MCG capsule, Take  by mouth., Disp: , Rfl:   •  VIIBRYD 20 MG tablet tablet, Take 20 mg by mouth Daily., Disp: , Rfl: 1    Allergies  "  Allergen Reactions   • Penicillins Hives     Pt does not remember what happens.         Social History     Socioeconomic History   • Marital status: Single     Spouse name: Not on file   • Number of children: Not on file   • Years of education: Not on file   • Highest education level: Not on file   Tobacco Use   • Smoking status: Current Every Day Smoker     Packs/day: 0.50   • Smokeless tobacco: Never Used   Substance and Sexual Activity   • Alcohol use: Yes     Comment: socially   • Drug use: Yes     Types: Marijuana   • Sexual activity: Yes     Partners: Male     Birth control/protection: Condom       Family History   Problem Relation Age of Onset   • Hypertension Mother    • Diabetes Father    • Diabetes Maternal Grandmother    • Hypertension Maternal Grandmother    • Diabetes Maternal Grandfather    • Hypertension Maternal Grandfather        Review of systems     Review of Systems   Constitutional: Positive for activity change, appetite change and fatigue.   Respiratory: Negative.    Cardiovascular: Negative.    Gastrointestinal: Positive for nausea and vomiting.   Genitourinary: Negative.  Negative for vaginal bleeding.   Musculoskeletal: Negative.    Skin: Negative.    Neurological: Negative.    Psychiatric/Behavioral: Negative for suicidal ideas. The patient is nervous/anxious.        Objective    /60   Ht 167.6 cm (66\")   Wt 105 kg (232 lb 4.8 oz)   LMP 03/28/2020 (Exact Date)   Breastfeeding No   BMI 37.49 kg/m²     Physical Exam   Constitutional: She is oriented to person, place, and time. She appears well-developed and well-nourished.   Pulmonary/Chest: Effort normal. No respiratory distress.   Musculoskeletal: Normal range of motion. She exhibits no edema.   Neurological: She is alert and oriented to person, place, and time.   Skin: Skin is warm and dry.   Psychiatric: She has a normal mood and affect. Her behavior is normal.   Vitals reviewed.      Assessment/Plan      1. Missed menses: + " UPT in office. US IMP: Single, viable IUP @ 8.3 weeks. EDC established  1/15/21. US and EDC rev'd with patient.   2. Oriented to practice. Pregnancy: Disc importance of regular prenatal care. Enc PNV daily. Counseled on providers and on call phone. Disc Tylenol products are ok and encouraged no ibuprofen or ASA in pregnancy. Disc travel and Zika and encouraged on use of bug repellant. Disc exercise in pregnancy, diet, expected weight gain, etc. Enc no use of tobacco, vaping, drugs, or alcohol during pregnancy. Rev warn s/s of SAB.   3. Labs: Pt counseled on genetic screening, Quad screen, AFP, and NIPS.   4.   Patient's Body mass index is 37.49 kg/m². BMI is above normal parameters. Recommendations include: exercise counseling and nutrition counseling. Obese women with a pre-pregnancy             BMI of 30+ should strive to gain approx 11-20 pounds for the entire pregnancy.   5.   H/O Bipolar, anxiety and depression: Stopped taking Lamictal and Viibryd when she learned she was pregnant. She is managed by a PCP in [facility] (uncertain name because she has        only seen them once). She does not desire to be on these medications during pregnancy. Disc use of Cat C drugs in pregnancy.  She reports she is doing well other than her anxiety right               now. She reports THC helps with her anxiety. ERX Buspar.   6.   Smoker- Was smoking approx 1 ppd but has cut down to approx 4 cigs/day. During this visit, approx 3-5 minutes counseling the patient regarding smoking cessation. PT COUNSELED TO QUIT SMOKING IN PREGNANCY.  She has been informed that cigarette smoking is associated with increased incidence of  birth, growth restriction, SIDS, et. Disc that smoking cessation is the single most important thing she can do to improve the pregnancy outcome.  She verbalized understanding to this discussion.    7.   Encourage flu and Tdap vaccine in pregnancy.   8.   THC use- Enc no use of THC in pregnancy.   9.   Nausea and vomiting- ERX Vit B6, unisom and zofran. Enc small frequent meals and hugh.     All questions answered.     Encounter Diagnoses   Name Primary?   • Screening for condition Yes         Diagnoses and all orders for this visit:    Screening for condition  -     POC Urinalysis Dipstick  -     POC Pregnancy, Urine        RTO in 1-2 weeks for new OB exam, labs and dating ultrasound.     Rowan Tinoco, PATO  6/8/2020  10:25

## 2020-06-23 ENCOUNTER — PROCEDURE VISIT (OUTPATIENT)
Dept: OBSTETRICS AND GYNECOLOGY | Facility: CLINIC | Age: 25
End: 2020-06-23

## 2020-06-23 ENCOUNTER — INITIAL PRENATAL (OUTPATIENT)
Dept: OBSTETRICS AND GYNECOLOGY | Facility: CLINIC | Age: 25
End: 2020-06-23

## 2020-06-23 VITALS — SYSTOLIC BLOOD PRESSURE: 100 MMHG | DIASTOLIC BLOOD PRESSURE: 82 MMHG | WEIGHT: 229.5 LBS | BODY MASS INDEX: 37.04 KG/M2

## 2020-06-23 DIAGNOSIS — O21.9 NAUSEA AND VOMITING DURING PREGNANCY PRIOR TO 22 WEEKS GESTATION: ICD-10-CM

## 2020-06-23 DIAGNOSIS — O36.80X0 PREGNANCY WITH UNCERTAIN FETAL VIABILITY, SINGLE OR UNSPECIFIED FETUS: Primary | ICD-10-CM

## 2020-06-23 DIAGNOSIS — F32.A DEPRESSION, UNSPECIFIED DEPRESSION TYPE: ICD-10-CM

## 2020-06-23 DIAGNOSIS — Z36.89 ENCOUNTER FOR OTHER SPECIFIED ANTENATAL SCREENING: ICD-10-CM

## 2020-06-23 DIAGNOSIS — Z34.91 INITIAL OBSTETRIC VISIT IN FIRST TRIMESTER: Primary | ICD-10-CM

## 2020-06-23 DIAGNOSIS — Z01.419 PAP SMEAR, LOW-RISK: ICD-10-CM

## 2020-06-23 DIAGNOSIS — Z01.419 ROUTINE GYNECOLOGICAL EXAMINATION: ICD-10-CM

## 2020-06-23 DIAGNOSIS — F41.9 ANXIETY: ICD-10-CM

## 2020-06-23 DIAGNOSIS — Z13.9 SCREENING FOR CONDITION: ICD-10-CM

## 2020-06-23 LAB
GLUCOSE UR STRIP-MCNC: NEGATIVE MG/DL
PROT UR STRIP-MCNC: ABNORMAL MG/DL

## 2020-06-23 PROCEDURE — 0501F PRENATAL FLOW SHEET: CPT | Performed by: NURSE PRACTITIONER

## 2020-06-23 PROCEDURE — 76817 TRANSVAGINAL US OBSTETRIC: CPT | Performed by: NURSE PRACTITIONER

## 2020-06-23 NOTE — PROGRESS NOTES
Initial ob visit     Chief Complaint   Patient presents with   • Routine Prenatal Visit       Trista Lim is being seen today for her first obstetrical visit.  She is a 24 y.o.    10w4d gestation. She continues to complain of anxiety, nausea and vomiting. Prior to pregnancy she struggled with anxiety. As a child, she reports she would have nausea and vomiting daily d/t anxiety. She was managed for her anxiety by a PCP in [place] prior to moving here. She is not currently in counseling and has stopped her regular medications. She did start Buspar after her last visit here. She states she can tell she is taking Buspar but her anxiety is still present. She thinks most of her N/V is related to anxiety. She also states she does not feel comfortable taking medication while she is pregnant. She reports her depression is stable without medication. She has recently quit her job because her anxiety was worse with work.     #: 1, Date: None, Sex: None, Weight: None, GA: None, Delivery: None, Apgar1: None, Apgar5: None, Living: None, Birth Comments: None      LNMP: 3/28/20  Confident with date: No  Taking prenatal vitamins: Yes. Needs RX: No  Planned pregnancy: No  Prior obstetric issues, potential pregnancy concerns: first pregnancy   Family history of genetic issues (includes FOB): denies  Prior infections concerning in pregnancy (Rash, fever in last 2 weeks): denies  Varicella Hx: vaccine as child  Flu vaccine: declines   History of STDs: Denies HSV   Current medications: PNV. She stopped taking Lamictal 200mg, Vybrid 20mg and Levomefolate Glucosamine 400mcg with pregnancy (managed by PCP in [place])  Last pap smear: , reports normal   Smoker: Yes  Drug or alcohol abuse: Yes, smokes THC daily   Prior testing for Cystic Fibrosis Carrier or Sickle Cell Trait- no  Prepregnancy BMI - Body mass index is 37.49 kg/m².  Prepregnancy BMI: Body mass index is 37.04 kg/m².    Past Medical History:   Diagnosis Date   • ADD  (attention deficit disorder)    • Anxiety    • Bipolar disorder (CMS/[facility])    • Depression    • GERD (gastroesophageal reflux disease)    • PCOS (polycystic ovarian syndrome)        Past Surgical History:   Procedure Laterality Date   • CHOLECYSTECTOMY           Current Outpatient Medications:   •  busPIRone (BUSPAR) 7.5 MG tablet, Take 1 tablet by mouth 2 (Two) Times a Day., Disp: 60 tablet, Rfl: 1  •  doxylamine (Unisom SleepTabs) 25 MG tablet, Take 1 tablet by mouth At Night As Needed for Nausea., Disp: 30 tablet, Rfl: 0  •  ondansetron (Zofran) 4 MG tablet, Take 1 tablet by mouth Every 8 (Eight) Hours As Needed for Nausea or Vomiting., Disp: 30 tablet, Rfl: 2  •  vitamin B-6 (PYRIDOXINE) 25 MG tablet, Take 1 tablet by mouth Every Night., Disp: 30 tablet, Rfl: 0    Allergies   Allergen Reactions   • Penicillins Hives     Pt does not remember what happens.         Social History     Socioeconomic History   • Marital status: Single     Spouse name: Not on file   • Number of children: Not on file   • Years of education: Not on file   • Highest education level: Not on file   Tobacco Use   • Smoking status: Current Every Day Smoker     Packs/day: 0.25   • Smokeless tobacco: Never Used   Substance and Sexual Activity   • Alcohol use: Yes     Comment: socially   • Drug use: Yes     Types: Marijuana   • Sexual activity: Yes     Partners: Male     Birth control/protection: Condom       Family History   Problem Relation Age of Onset   • Hypertension Mother    • Diabetes Father    • Diabetes Maternal Grandmother    • Hypertension Maternal Grandmother    • Diabetes Maternal Grandfather    • Hypertension Maternal Grandfather        Review of systems     All other systems reviewed and are negative except for: Constitutional: positive for fatigue  Gastrointestinal: positive for nausea and vomiting  Behavioral/Psych: positive for anxiety     Objective    Wt 104 kg (229 lb 8 oz)   LMP 03/28/2020 (Exact Date)   BMI 37.04 kg/m²        General Appearance:    Alert, cooperative, in no acute distress, habitus obese   Head:    Not examined   Eyes:           Not examined   Ears:  Not examined       Neck:  No thyroid enlargement or nodules present   Back:     No kyphosis present, no scoliosis present,                       Lungs:     Clear to auscultation,respirations regular, even and                   unlabored    Heart:    Regular rhythm and normal rate, normal S1 and S2, no            murmur, no gallop, no rub, no click   Breast Exam:    No masses, No nipple discharge   Abdomen:     Normal bowel sounds, no masses, no organomegaly, soft        non-tender, non-distended, no guarding, no rebound                 tenderness   Genitalia:    Vulva - No masses, no atrophy, no lesions    Vagina - No discharge, No bleeding    Cervix - No Lesions, closed. Pap collected:Yes     Uterus - Consistent with 10 weeks.     Adnexa - No masses, non tender       Extremities:   Moves all extremities well, no edema, no cyanosis, no              redness       Skin:   No bleeding, bruising or rash   Lymph nodes:   No palpable adenopathy   Neurologic:   Sensation intact, A&O times 3      Assessment/Plan    1) Pregnancy at 10w4d-  EDC established 1/15/20 and confirmed by US on 6/8/20.  Unable to doppler FHTs, pt to US for viability scan.     2) OB exam: OB exam completed: Yes. New OB bag provided Yes. Pap collected: Yes.    3) Labs: OB labs collected: Yes Counseled on genetic screening: Yes, she declines CF, SMA, and Fragile X. Counseled on Quad screen and AFP: Yes, she is too early. Counseled on NIPS: Yes, she desires NIPS.      4) Patient's Body mass index is 37.04 kg/m². BMI is above normal parameters. Recommendations include: exercise counseling and nutrition counseling. Obese women with a pre-pregnancy BMI of 30+ should strive to gain approx 11-20 pounds for the entire pregnancy.     5) Anxiety and depression- Reports her depression is well managed. She stopped  taking Lamictal and Vybrid when she learned she was pregnant. Is taking Buspar 7.5mg BID with some relief of her anxiety. Her anxiety has improved some since she stopped working as well. She is not really comfortable taking medication while she is pregnant. She is working on getting an appt with a counselor.     6) Nausea and vomiting- Thinks some of her N/V is related to anxiety. She has VIt B6, unisom and zofran for use.     7) THC use- Uses to help with anxiety. Enc no use in pregnancy. She understands a UDS will be collected today.     6)  Prenatal care: Oriented to the office and prenatal care. Encourage prenatal vitamins. Disc Tylenol products are fine, avoid aspirin and ibuprofen; Zika (travel restrictions/ok to use insect repellant); not to change cat litter; food restrictions; exercise;  avoidance of alcohol, tobacco, drugs and saunas/hot tubs.       All questions answered.     RTO 4 weeks     Rowan Tinoco, PATO  6/23/2020  15:32

## 2020-06-24 LAB
ABO GROUP BLD: ABNORMAL
BASOPHILS # BLD AUTO: 0 X10E3/UL (ref 0–0.2)
BASOPHILS NFR BLD AUTO: 0 %
BLD GP AB SCN SERPL QL: NEGATIVE
EOSINOPHIL # BLD AUTO: 0.1 X10E3/UL (ref 0–0.4)
EOSINOPHIL NFR BLD AUTO: 1 %
ERYTHROCYTE [DISTWIDTH] IN BLOOD BY AUTOMATED COUNT: 12.5 % (ref 11.7–15.4)
HBA1C MFR BLD: 5 % (ref 4.8–5.6)
HBV SURFACE AG SERPL QL IA: NEGATIVE
HCT VFR BLD AUTO: 38.4 % (ref 34–46.6)
HCV AB S/CO SERPL IA: <0.1 S/CO RATIO (ref 0–0.9)
HGB BLD-MCNC: 13.3 G/DL (ref 11.1–15.9)
HIV 1+2 AB+HIV1 P24 AG SERPL QL IA: NON REACTIVE
IMM GRANULOCYTES # BLD AUTO: 0 X10E3/UL (ref 0–0.1)
IMM GRANULOCYTES NFR BLD AUTO: 0 %
LYMPHOCYTES # BLD AUTO: 2 X10E3/UL (ref 0.7–3.1)
LYMPHOCYTES NFR BLD AUTO: 18 %
MCH RBC QN AUTO: 31.1 PG (ref 26.6–33)
MCHC RBC AUTO-ENTMCNC: 34.6 G/DL (ref 31.5–35.7)
MCV RBC AUTO: 90 FL (ref 79–97)
MONOCYTES # BLD AUTO: 0.9 X10E3/UL (ref 0.1–0.9)
MONOCYTES NFR BLD AUTO: 9 %
NEUTROPHILS # BLD AUTO: 7.9 X10E3/UL (ref 1.4–7)
NEUTROPHILS NFR BLD AUTO: 72 %
PLATELET # BLD AUTO: 208 X10E3/UL (ref 150–450)
RBC # BLD AUTO: 4.27 X10E6/UL (ref 3.77–5.28)
RH BLD: POSITIVE
RPR SER QL: NON REACTIVE
RUBV IGG SERPL IA-ACNC: 4.15 INDEX
WBC # BLD AUTO: 10.9 X10E3/UL (ref 3.4–10.8)

## 2020-06-26 LAB
C TRACH RRNA CVX QL NAA+PROBE: POSITIVE
CONV .: ABNORMAL
CYTOLOGIST CVX/VAG CYTO: ABNORMAL
CYTOLOGY CVX/VAG DOC CYTO: ABNORMAL
CYTOLOGY CVX/VAG DOC THIN PREP: ABNORMAL
DX ICD CODE: ABNORMAL
HIV 1 & 2 AB SER-IMP: ABNORMAL
N GONORRHOEA RRNA CVX QL NAA+PROBE: NEGATIVE
OTHER STN SPEC: ABNORMAL
STAT OF ADQ CVX/VAG CYTO-IMP: ABNORMAL
T VAGINALIS RRNA SPEC QL NAA+PROBE: NEGATIVE

## 2020-06-29 PROBLEM — O98.819 CHLAMYDIA INFECTION AFFECTING PREGNANCY: Status: ACTIVE | Noted: 2020-06-29

## 2020-06-29 PROBLEM — A74.9 CHLAMYDIA INFECTION AFFECTING PREGNANCY: Status: ACTIVE | Noted: 2020-06-29

## 2020-06-29 PROBLEM — R82.71 GBS BACTERIURIA: Status: ACTIVE | Noted: 2020-06-29

## 2020-06-29 LAB
AMPHETAMINES UR QL SCN: NEGATIVE NG/ML
BACTERIA UR CULT: ABNORMAL
BARBITURATES UR QL SCN: NEGATIVE NG/ML
BENZODIAZ UR QL SCN: NEGATIVE NG/ML
BZE UR QL SCN: NEGATIVE NG/ML
CANNABINOIDS UR QL SCN: POSITIVE NG/ML
CREAT UR-MCNC: 161.4 MG/DL (ref 20–300)
LABORATORY COMMENT REPORT: ABNORMAL
METHADONE UR QL SCN: NEGATIVE NG/ML
OPIATES UR QL SCN: NEGATIVE NG/ML
OXYCODONE+OXYMORPHONE UR QL SCN: NEGATIVE NG/ML
PCP UR QL: NEGATIVE NG/ML
PH UR: 8.3 [PH] (ref 4.5–8.9)
PROPOXYPH UR QL SCN: NEGATIVE NG/ML

## 2020-06-29 RX ORDER — AZITHROMYCIN 500 MG/1
TABLET, FILM COATED ORAL
Qty: 2 TABLET | Refills: 0 | Status: SHIPPED | OUTPATIENT
Start: 2020-06-29 | End: 2020-09-10

## 2020-07-23 ENCOUNTER — ROUTINE PRENATAL (OUTPATIENT)
Dept: OBSTETRICS AND GYNECOLOGY | Facility: CLINIC | Age: 25
End: 2020-07-23

## 2020-07-23 VITALS — SYSTOLIC BLOOD PRESSURE: 120 MMHG | DIASTOLIC BLOOD PRESSURE: 74 MMHG | BODY MASS INDEX: 37.2 KG/M2 | WEIGHT: 230.5 LBS

## 2020-07-23 DIAGNOSIS — O98.812 CHLAMYDIA INFECTION AFFECTING PREGNANCY IN SECOND TRIMESTER: ICD-10-CM

## 2020-07-23 DIAGNOSIS — Z3A.14 14 WEEKS GESTATION OF PREGNANCY: Primary | ICD-10-CM

## 2020-07-23 DIAGNOSIS — R82.71 GBS BACTERIURIA: ICD-10-CM

## 2020-07-23 DIAGNOSIS — O21.9 NAUSEA AND VOMITING DURING PREGNANCY PRIOR TO 22 WEEKS GESTATION: ICD-10-CM

## 2020-07-23 DIAGNOSIS — A74.9 CHLAMYDIA INFECTION AFFECTING PREGNANCY IN SECOND TRIMESTER: ICD-10-CM

## 2020-07-23 DIAGNOSIS — R82.5 POSITIVE URINE DRUG SCREEN: ICD-10-CM

## 2020-07-23 PROBLEM — Z34.91 INITIAL OBSTETRIC VISIT IN FIRST TRIMESTER: Status: RESOLVED | Noted: 2020-06-23 | Resolved: 2020-07-23

## 2020-07-23 LAB
GLUCOSE UR STRIP-MCNC: NEGATIVE MG/DL
PROT UR STRIP-MCNC: NEGATIVE MG/DL

## 2020-07-23 PROCEDURE — 0502F SUBSEQUENT PRENATAL CARE: CPT | Performed by: OBSTETRICS & GYNECOLOGY

## 2020-07-23 NOTE — PROGRESS NOTES
OB follow up     Chief Complaint: PNC FU    Trista Lim is a 24 y.o.  14w6d being seen today for her obstetrical visit.  Patient reports no complaints. Fetal movement: not yet. Pt is still smoking THC- reports she is still smoking for nausea. She is only taking Vitamin B6. She declines other meds and states she is not comfortable taking meds in pregnancy. Pt was diagnosed with CT- she took her antibiotics and boyfriend was treated as well. Pt reports nausea is improving.    Review of Systems  No bleeding, No cramping/contractions     /74   Wt 105 kg (230 lb 8 oz)   LMP 2020 (Exact Date)   BMI 37.20 kg/m²     FHT:   present   Uterine Size:           Assessment/Plan: Low risk pregnancy    1) pregnancy at 14w6d: cfDNA low risk. Check AFP at next visit     2) +THC: pt admits to still smoking THC. She states he is doing it for nausea. Discussed with pt the importance of stopping THC and the possibility of THC getting into baby's system. Pt seems disinterested in quitting. Will need fu UDS in third trimester.    3) N/V: Improved. Pt is only taking Vitamin B6 and refuses other meds. She has gained 1 pound in 4 weeks.    4) +CT: s/p treatment. Partner has also been treated. Pt needs SMILEY at fu appt.    5) GBBS bacteruria: Needs antibiotics in labor    6) Anxiety: ON Buspar    Reviewed this stage of pregnancy  Problem list updated   No follow-ups on file.      Saira Delgado DO    2020  12:16

## 2020-09-10 ENCOUNTER — PROCEDURE VISIT (OUTPATIENT)
Dept: OBSTETRICS AND GYNECOLOGY | Facility: CLINIC | Age: 25
End: 2020-09-10

## 2020-09-10 ENCOUNTER — ROUTINE PRENATAL (OUTPATIENT)
Dept: OBSTETRICS AND GYNECOLOGY | Facility: CLINIC | Age: 25
End: 2020-09-10

## 2020-09-10 VITALS — SYSTOLIC BLOOD PRESSURE: 120 MMHG | DIASTOLIC BLOOD PRESSURE: 88 MMHG | WEIGHT: 224.8 LBS | BODY MASS INDEX: 36.28 KG/M2

## 2020-09-10 DIAGNOSIS — O26.899 FEELING PELVIC PRESSURE DURING PREGNANCY, ANTEPARTUM: Primary | ICD-10-CM

## 2020-09-10 DIAGNOSIS — Z3A.21 21 WEEKS GESTATION OF PREGNANCY: ICD-10-CM

## 2020-09-10 DIAGNOSIS — F41.9 ANXIETY: ICD-10-CM

## 2020-09-10 DIAGNOSIS — R10.2 FEELING PELVIC PRESSURE DURING PREGNANCY, ANTEPARTUM: Primary | ICD-10-CM

## 2020-09-10 DIAGNOSIS — R82.71 GBS BACTERIURIA: ICD-10-CM

## 2020-09-10 DIAGNOSIS — O98.819 CHLAMYDIA INFECTION AFFECTING PREGNANCY, ANTEPARTUM: Primary | ICD-10-CM

## 2020-09-10 DIAGNOSIS — Z36.89 ENCOUNTER FOR FETAL ANATOMIC SURVEY: ICD-10-CM

## 2020-09-10 DIAGNOSIS — A74.9 CHLAMYDIA INFECTION AFFECTING PREGNANCY, ANTEPARTUM: Primary | ICD-10-CM

## 2020-09-10 DIAGNOSIS — R82.5 POSITIVE URINE DRUG SCREEN: ICD-10-CM

## 2020-09-10 LAB
GLUCOSE UR STRIP-MCNC: NEGATIVE MG/DL
PROT UR STRIP-MCNC: NEGATIVE MG/DL

## 2020-09-10 PROCEDURE — 76817 TRANSVAGINAL US OBSTETRIC: CPT | Performed by: OBSTETRICS & GYNECOLOGY

## 2020-09-10 PROCEDURE — 76805 OB US >/= 14 WKS SNGL FETUS: CPT | Performed by: OBSTETRICS & GYNECOLOGY

## 2020-09-10 PROCEDURE — 0502F SUBSEQUENT PRENATAL CARE: CPT | Performed by: OBSTETRICS & GYNECOLOGY

## 2020-09-10 NOTE — PROGRESS NOTES
OB follow up     Chief Complaint: PNC FU    Trista Lim is a 25 y.o.  21w6d being seen today for her obstetrical visit.  Patient reports pelvic pressure and round ligament pain. Fetal movement: normal. Pt missed her appt 2 weeks and has not had an Anatomy US. Pt is still having nausea and vomiting. She recently started on Zofran and is taking vitamin B6. Pt is eating and tolerating po. She is still having a lot of anxiety on Buspar and thinks the N/V is due to her anxiety. Pt states she stopped THC use 5 days ago.     Review of Systems  No bleeding, No cramping/contractions     /88   Wt 102 kg (224 lb 12.8 oz)   LMP 2020 (Exact Date)   BMI 36.28 kg/m²     FHT:   present   Uterine Size:   22cm         Assessment/Plan: Low risk pregnancy    1) pregnancy at 21w6d: cfDNA low risk. Missed AFP. Check Anatomy US today.      2) +THC: pt was counseled at last visit. She reports she quit THC 5 days ago.     3) N/V: Improved. Pt is only taking Vitamin B6 and Zofran.      4) +CT: s/p treatment. Partner has also been treated. Check SMILEY today.     5) GBBS bacteruria: Needs antibiotics in labor     6) Anxiety: ON Buspar but reports that it is not helping. Pt requesting another med. Stop Buspar. Start Zoloft 50mg po daily.      7) Pelvic pressure: Check CL.        Reviewed this stage of pregnancy  Problem list updated   No follow-ups on file.      Saira Delgado DO    9/10/2020  12:40

## 2020-09-13 LAB
C TRACH RRNA SPEC QL NAA+PROBE: NEGATIVE
N GONORRHOEA RRNA SPEC QL NAA+PROBE: NEGATIVE
T VAGINALIS DNA SPEC QL NAA+PROBE: NEGATIVE

## 2020-10-01 LAB
AFP INTERP SERPL-IMP: NORMAL
EXTERNAL NIPT: NORMAL

## 2020-10-05 ENCOUNTER — ROUTINE PRENATAL (OUTPATIENT)
Dept: OBSTETRICS AND GYNECOLOGY | Facility: CLINIC | Age: 25
End: 2020-10-05

## 2020-10-05 VITALS — DIASTOLIC BLOOD PRESSURE: 86 MMHG | SYSTOLIC BLOOD PRESSURE: 120 MMHG | BODY MASS INDEX: 36.27 KG/M2 | WEIGHT: 224.7 LBS

## 2020-10-05 DIAGNOSIS — O98.819 CHLAMYDIA INFECTION AFFECTING PREGNANCY, ANTEPARTUM: ICD-10-CM

## 2020-10-05 DIAGNOSIS — K21.9 GASTROESOPHAGEAL REFLUX DISEASE WITHOUT ESOPHAGITIS: ICD-10-CM

## 2020-10-05 DIAGNOSIS — E28.2 PCOS (POLYCYSTIC OVARIAN SYNDROME): ICD-10-CM

## 2020-10-05 DIAGNOSIS — R82.71 GBS BACTERIURIA: ICD-10-CM

## 2020-10-05 DIAGNOSIS — R82.5 POSITIVE URINE DRUG SCREEN: ICD-10-CM

## 2020-10-05 DIAGNOSIS — A74.9 CHLAMYDIA INFECTION AFFECTING PREGNANCY, ANTEPARTUM: ICD-10-CM

## 2020-10-05 DIAGNOSIS — F41.9 ANXIETY: ICD-10-CM

## 2020-10-05 DIAGNOSIS — Z86.39 HISTORY OF LOW POTASSIUM: Primary | ICD-10-CM

## 2020-10-05 DIAGNOSIS — O21.9 NAUSEA AND VOMITING DURING PREGNANCY PRIOR TO 22 WEEKS GESTATION: ICD-10-CM

## 2020-10-05 PROBLEM — J02.9 SORE THROAT: Status: RESOLVED | Noted: 2020-05-07 | Resolved: 2020-10-05

## 2020-10-05 LAB
ALBUMIN SERPL-MCNC: 3.9 G/DL (ref 3.5–5.2)
ALBUMIN/GLOB SERPL: 1.9 G/DL
ALP SERPL-CCNC: 80 U/L (ref 39–117)
ALT SERPL-CCNC: 79 U/L (ref 1–33)
AST SERPL-CCNC: 43 U/L (ref 1–32)
BILIRUB SERPL-MCNC: 0.3 MG/DL (ref 0–1.2)
BUN SERPL-MCNC: 5 MG/DL (ref 6–20)
BUN/CREAT SERPL: 8.1 (ref 7–25)
CALCIUM SERPL-MCNC: 8.5 MG/DL (ref 8.6–10.5)
CHLORIDE SERPL-SCNC: 105 MMOL/L (ref 98–107)
CO2 SERPL-SCNC: 21.9 MMOL/L (ref 22–29)
CREAT SERPL-MCNC: 0.62 MG/DL (ref 0.57–1)
GLOBULIN SER CALC-MCNC: 2.1 GM/DL
GLUCOSE SERPL-MCNC: 78 MG/DL (ref 65–99)
GLUCOSE UR STRIP-MCNC: NEGATIVE MG/DL
POTASSIUM SERPL-SCNC: 3.8 MMOL/L (ref 3.5–5.2)
PROT SERPL-MCNC: 6 G/DL (ref 6–8.5)
PROT UR STRIP-MCNC: NEGATIVE MG/DL
SODIUM SERPL-SCNC: 135 MMOL/L (ref 136–145)

## 2020-10-05 PROCEDURE — 0502F SUBSEQUENT PRENATAL CARE: CPT | Performed by: OBSTETRICS & GYNECOLOGY

## 2020-10-05 RX ORDER — ONDANSETRON 4 MG/1
4 TABLET, ORALLY DISINTEGRATING ORAL EVERY 8 HOURS PRN
COMMUNITY
End: 2022-11-18

## 2020-10-05 RX ORDER — DIPHENHYDRAMINE HYDROCHLORIDE 25 MG/1
25 CAPSULE ORAL NIGHTLY
Qty: 30 TABLET | Refills: 3 | Status: SHIPPED | OUTPATIENT
Start: 2020-10-05 | End: 2022-11-18

## 2020-10-05 RX ORDER — FAMOTIDINE 20 MG/1
20 TABLET, FILM COATED ORAL 2 TIMES DAILY
Qty: 60 TABLET | Refills: 3 | Status: SHIPPED | OUTPATIENT
Start: 2020-10-05 | End: 2021-10-05

## 2020-10-05 NOTE — PROGRESS NOTES
OB follow up     Trista Lim is a 25 y.o.  25w3d being seen today for her obstetrical visit.  Patient reports that she had a syncopal episode yesterday and went to the ER in [place]. She was told she was dehydrated and low in potassium and felt better after IV fluids. Her nausea is better overall but she still has GERD despite Nexium nightly and Tums. We discussed adding Pepcid and she is agreeable. Her po intake has been poor the last few days as she has been fighting with her boyfriend because he has been unfaithful. She denies any safety issues at home.  She feels better on Zoloft but was nauseated with 50 mg so is taking 25 mg and doing fine.  Fetal movement: normal. This is the first time I am seeing this patient in this pregnancy and all of her problems are new to me, the examiner.       Review of Systems  No bleeding, No cramping/contractions     /86   Wt 102 kg (224 lb 11.2 oz)   LMP 2020 (Exact Date)   BMI 36.27 kg/m²     FHT: 145 BPM   Uterine Size: 26  cm       Assessment/Plan:    1) 25 y.o.  -pregnancy at 25w3d     2) +THC: quit THC 3 weeks ago. Info on MJ use in pregnancy given to pt for review. Recheck UDS in later 3rd trimester.      3) N/V:  Pt is taking Vitamin B6 and Zofran. enc improved po intake and adequate hydration     4) +CT: s/p treatment. Partner has also been treated. SMILEY neg. Enc condoms. Recheck at 36 weeks.     5) GBBS bacteruria: Needs antibiotics in labor. Has Pen G allergy so would benefit from a culture at 36 weeks for sensitivities     6) Anxiety: improved on Zoloft 25mg po daily.      7) Declines flu shot. ACOG info on flu shot given.     8) Hypokalemia- K+ was 2.9 yesterday. Enc banana or OJ use daily. Check CMP today    9) PCOS- HgBA1c- 5.0    10) GERD- on Nexium nightly, add Pepcid 20 mg BID     11)Reviewed this stage of pregnancy. COVID precautions given. I saw the patient with a face mask, gloves and eye protection  The patient herself was  masked.  Social distancing was observed as appropriate.    12)Problem list updated     13) RTO 2 weeks OBT, 2 hour GTT, RH+ and Tdap      Indu Levy MD    10/5/2020  11:40 EDT

## 2020-10-06 PROBLEM — R74.8 ELEVATED LIVER ENZYMES: Status: ACTIVE | Noted: 2020-10-06

## 2020-10-06 NOTE — PROGRESS NOTES
PIP= her potassium is normal but her liver enzymes are now elevated. This can happen due to viral illness, but we need to do a repeat CMP and an acute hepatitis panel at her next appt.

## 2020-10-07 ENCOUNTER — HOSPITAL ENCOUNTER (EMERGENCY)
Facility: HOSPITAL | Age: 25
Discharge: HOME | End: 2020-10-07
Attending: EMERGENCY MEDICINE | Admitting: EMERGENCY MEDICINE

## 2020-10-07 ENCOUNTER — HOSPITAL ENCOUNTER (EMERGENCY)
Facility: HOSPITAL | Age: 25
End: 2020-10-07

## 2020-10-07 VITALS
HEART RATE: 68 BPM | WEIGHT: 224 LBS | RESPIRATION RATE: 18 BRPM | DIASTOLIC BLOOD PRESSURE: 74 MMHG | SYSTOLIC BLOOD PRESSURE: 122 MMHG | TEMPERATURE: 97.2 F | BODY MASS INDEX: 30.34 KG/M2 | OXYGEN SATURATION: 100 % | HEIGHT: 72 IN

## 2020-10-07 DIAGNOSIS — N39.0 ACUTE UTI: ICD-10-CM

## 2020-10-07 DIAGNOSIS — Z3A.26 26 WEEKS GESTATION OF PREGNANCY: ICD-10-CM

## 2020-10-07 DIAGNOSIS — R74.01 TRANSAMINITIS: ICD-10-CM

## 2020-10-07 DIAGNOSIS — K52.9 GASTROENTERITIS: Primary | ICD-10-CM

## 2020-10-07 LAB
ALBUMIN SERPL-MCNC: 3.5 G/DL (ref 3.5–5.2)
ALBUMIN/GLOB SERPL: 1.1 G/DL
ALP SERPL-CCNC: 74 U/L (ref 39–117)
ALT SERPL W P-5'-P-CCNC: 104 U/L (ref 1–33)
ANION GAP SERPL CALCULATED.3IONS-SCNC: 11 MMOL/L (ref 5–15)
AST SERPL-CCNC: 43 U/L (ref 1–32)
BACTERIA UR QL AUTO: ABNORMAL /HPF
BASOPHILS # BLD AUTO: 0.02 10*3/MM3 (ref 0–0.2)
BASOPHILS NFR BLD AUTO: 0.3 % (ref 0–1.5)
BILIRUB SERPL-MCNC: 0.5 MG/DL (ref 0–1.2)
BILIRUB UR QL STRIP: NEGATIVE
BUN SERPL-MCNC: 7 MG/DL (ref 6–20)
BUN/CREAT SERPL: 11.5 (ref 7–25)
CALCIUM SPEC-SCNC: 9.1 MG/DL (ref 8.6–10.5)
CHLORIDE SERPL-SCNC: 102 MMOL/L (ref 98–107)
CLARITY UR: CLEAR
CO2 SERPL-SCNC: 21 MMOL/L (ref 22–29)
COLOR UR: YELLOW
CREAT SERPL-MCNC: 0.61 MG/DL (ref 0.57–1)
DEPRECATED RDW RBC AUTO: 42.8 FL (ref 37–54)
EOSINOPHIL # BLD AUTO: 0.09 10*3/MM3 (ref 0–0.4)
EOSINOPHIL NFR BLD AUTO: 1.4 % (ref 0.3–6.2)
ERYTHROCYTE [DISTWIDTH] IN BLOOD BY AUTOMATED COUNT: 13 % (ref 12.3–15.4)
GFR SERPL CREATININE-BSD FRML MDRD: 122 ML/MIN/1.73
GLOBULIN UR ELPH-MCNC: 3.1 GM/DL
GLUCOSE SERPL-MCNC: 80 MG/DL (ref 65–99)
GLUCOSE UR STRIP-MCNC: NEGATIVE MG/DL
HCT VFR BLD AUTO: 34.5 % (ref 34–46.6)
HGB BLD-MCNC: 12 G/DL (ref 12–15.9)
HGB UR QL STRIP.AUTO: NEGATIVE
HYALINE CASTS UR QL AUTO: ABNORMAL /LPF
IMM GRANULOCYTES # BLD AUTO: 0.02 10*3/MM3 (ref 0–0.05)
IMM GRANULOCYTES NFR BLD AUTO: 0.3 % (ref 0–0.5)
KETONES UR QL STRIP: ABNORMAL
LDH SERPL-CCNC: 131 U/L (ref 135–214)
LEUKOCYTE ESTERASE UR QL STRIP.AUTO: ABNORMAL
LYMPHOCYTES # BLD AUTO: 1.47 10*3/MM3 (ref 0.7–3.1)
LYMPHOCYTES NFR BLD AUTO: 22.3 % (ref 19.6–45.3)
MCH RBC QN AUTO: 31.6 PG (ref 26.6–33)
MCHC RBC AUTO-ENTMCNC: 34.8 G/DL (ref 31.5–35.7)
MCV RBC AUTO: 90.8 FL (ref 79–97)
MONOCYTES # BLD AUTO: 0.72 10*3/MM3 (ref 0.1–0.9)
MONOCYTES NFR BLD AUTO: 10.9 % (ref 5–12)
NEUTROPHILS NFR BLD AUTO: 4.26 10*3/MM3 (ref 1.7–7)
NEUTROPHILS NFR BLD AUTO: 64.8 % (ref 42.7–76)
NITRITE UR QL STRIP: NEGATIVE
NRBC BLD AUTO-RTO: 0 /100 WBC (ref 0–0.2)
PH UR STRIP.AUTO: 8 [PH] (ref 4.5–8)
PLATELET # BLD AUTO: 163 10*3/MM3 (ref 140–450)
PMV BLD AUTO: 9.6 FL (ref 6–12)
POTASSIUM SERPL-SCNC: 3.3 MMOL/L (ref 3.5–5.2)
PROT SERPL-MCNC: 6.6 G/DL (ref 6–8.5)
PROT UR QL STRIP: NEGATIVE
RBC # BLD AUTO: 3.8 10*6/MM3 (ref 3.77–5.28)
RBC # UR: ABNORMAL /HPF
REF LAB TEST METHOD: ABNORMAL
SODIUM SERPL-SCNC: 134 MMOL/L (ref 136–145)
SP GR UR STRIP: 1.01 (ref 1–1.03)
SQUAMOUS #/AREA URNS HPF: ABNORMAL /HPF
UROBILINOGEN UR QL STRIP: ABNORMAL
WBC # BLD AUTO: 6.58 10*3/MM3 (ref 3.4–10.8)
WBC UR QL AUTO: ABNORMAL /HPF

## 2020-10-07 PROCEDURE — 80053 COMPREHEN METABOLIC PANEL: CPT | Performed by: EMERGENCY MEDICINE

## 2020-10-07 PROCEDURE — 25010000002 METOCLOPRAMIDE PER 10 MG: Performed by: EMERGENCY MEDICINE

## 2020-10-07 PROCEDURE — 99283 EMERGENCY DEPT VISIT LOW MDM: CPT

## 2020-10-07 PROCEDURE — 96375 TX/PRO/DX INJ NEW DRUG ADDON: CPT

## 2020-10-07 PROCEDURE — 81001 URINALYSIS AUTO W/SCOPE: CPT | Performed by: EMERGENCY MEDICINE

## 2020-10-07 PROCEDURE — 83615 LACTATE (LD) (LDH) ENZYME: CPT | Performed by: EMERGENCY MEDICINE

## 2020-10-07 PROCEDURE — 85025 COMPLETE CBC W/AUTO DIFF WBC: CPT | Performed by: EMERGENCY MEDICINE

## 2020-10-07 PROCEDURE — 96374 THER/PROPH/DIAG INJ IV PUSH: CPT

## 2020-10-07 PROCEDURE — 87086 URINE CULTURE/COLONY COUNT: CPT | Performed by: EMERGENCY MEDICINE

## 2020-10-07 PROCEDURE — 99284 EMERGENCY DEPT VISIT MOD MDM: CPT | Performed by: EMERGENCY MEDICINE

## 2020-10-07 RX ORDER — SODIUM CHLORIDE 0.9 % (FLUSH) 0.9 %
10 SYRINGE (ML) INJECTION AS NEEDED
Status: DISCONTINUED | OUTPATIENT
Start: 2020-10-07 | End: 2020-10-07 | Stop reason: HOSPADM

## 2020-10-07 RX ORDER — DEXTROSE MONOHYDRATE 25 G/50ML
25 INJECTION, SOLUTION INTRAVENOUS ONCE
Status: COMPLETED | OUTPATIENT
Start: 2020-10-07 | End: 2020-10-07

## 2020-10-07 RX ORDER — CEFUROXIME AXETIL 500 MG/1
500 TABLET ORAL 2 TIMES DAILY
Qty: 10 TABLET | Refills: 0 | Status: SHIPPED | OUTPATIENT
Start: 2020-10-07 | End: 2022-11-18

## 2020-10-07 RX ORDER — METOCLOPRAMIDE HYDROCHLORIDE 5 MG/ML
10 INJECTION INTRAMUSCULAR; INTRAVENOUS ONCE
Status: COMPLETED | OUTPATIENT
Start: 2020-10-07 | End: 2020-10-07

## 2020-10-07 RX ADMIN — DEXTROSE MONOHYDRATE 25 G: 25 INJECTION, SOLUTION INTRAVENOUS at 07:29

## 2020-10-07 RX ADMIN — SODIUM CHLORIDE 500 ML: 9 INJECTION, SOLUTION INTRAVENOUS at 07:21

## 2020-10-07 RX ADMIN — METOCLOPRAMIDE 10 MG: 5 INJECTION, SOLUTION INTRAMUSCULAR; INTRAVENOUS at 07:18

## 2020-10-07 RX ADMIN — SODIUM CHLORIDE 1000 ML: 9 INJECTION, SOLUTION INTRAVENOUS at 07:24

## 2020-10-07 NOTE — DISCHARGE INSTRUCTIONS
Clear liquids for 24 to 48 hours, then advance diet as tolerated.  Follow-up with [facility] this week.  Return to the emergency department there is vomiting not controlled with Zofran, vaginal bleeding, increased pain, fever, or worse in any way at all.

## 2020-10-07 NOTE — ED PROVIDER NOTES
EMERGENCY DEPARTMENT ENCOUNTER      Room Number: 2/02      HPI:    Chief complaint: Nausea, vomiting, diarrhea      Quality/Severity: Moderate    Timing/Duration: Last 2 days    Modifying Factors: Eating    Associated Symptoms: Denies fevers, denies abdominal pain    Narrative: Pt is a 25 y.o. female who presents complaining of nausea vomiting and diarrhea since Sunday.  She is 26 weeks pregnant and says that she has had difficulty with nausea the entire pregnancy but on Sunday she started having more vomiting and diarrhea.  Her emesis is nonbloody and nonbilious.  Her diarrhea is described as watery and it is also nonbloody.  She relates being evaluated on Sunday at another ER when she had a syncopal episode and she was discharged home and followed up with OB Dr. Levy on Monday.  Labs were rechecked at that time which showed mildly elevated LFTs,  But were reportedly normal otherwise.  She reports that OB/GYN told her to come to the emergency room if she felt like she was not getting well.    PMD: Provider, No Known    REVIEW OF SYSTEMS  Review of Systems   Constitutional: Negative for activity change, appetite change, chills and fever.   Respiratory: Negative for chest tightness and shortness of breath.    Cardiovascular: Negative for chest pain and leg swelling.   Gastrointestinal: Positive for diarrhea, nausea and vomiting. Negative for abdominal pain.   Genitourinary: Negative for difficulty urinating and dysuria.   Musculoskeletal: Negative for arthralgias and joint swelling.   Skin: Negative for color change and rash.   Neurological: Negative for dizziness and weakness.   Hematological: Negative for adenopathy. Does not bruise/bleed easily.   Psychiatric/Behavioral: Negative for agitation and confusion.     PAST MEDICAL HISTORY  Active Ambulatory Problems     Diagnosis Date Noted   • Loss of appetite 05/07/2020   • Loss of taste 05/07/2020   • Acute otitis media 05/07/2020   • Suspected COVID-19 virus  infection 05/07/2020   • Anxiety 06/23/2020   • Nausea and vomiting during pregnancy prior to 22 weeks gestation 06/23/2020   • Depression 06/23/2020   • Chlamydia infection affecting pregnancy: Tx'd 6/29/20, SMILEY neg, recheck C/G/T at 36 weeks 06/29/2020   • GBS bacteriuria: Needs antibx during labor. PCN allergy.  06/29/2020   • Positive urine drug screen: positive THC use. Pt is still smoking. Counseled to stop 7/23/20, stopped 9/2020 07/23/2020   • PCOS (polycystic ovarian syndrome) 10/05/2020   • GERD (gastroesophageal reflux disease) 10/05/2020   • History of low potassium 10/05/2020   • Elevated liver enzymes @ 25 weeks- recehck CMP and acute hepatits panel 10/06/2020     Resolved Ambulatory Problems     Diagnosis Date Noted   • Sore throat 05/07/2020   • Initial obstetric visit in first trimester 06/23/2020     Past Medical History:   Diagnosis Date   • ADD (attention deficit disorder)    • Bipolar disorder (CMS/HCC)        PAST SURGICAL HISTORY  Past Surgical History:   Procedure Laterality Date   • CHOLECYSTECTOMY         FAMILY HISTORY  Family History   Problem Relation Age of Onset   • Hypertension Mother    • Diabetes Father    • Diabetes Maternal Grandmother    • Hypertension Maternal Grandmother    • Diabetes Maternal Grandfather    • Hypertension Maternal Grandfather        SOCIAL HISTORY  Social History     Socioeconomic History   • Marital status: Single     Spouse name: Not on file   • Number of children: Not on file   • Years of education: Not on file   • Highest education level: Not on file   Tobacco Use   • Smoking status: Current Every Day Smoker     Packs/day: 0.25   • Smokeless tobacco: Never Used   Substance and Sexual Activity   • Alcohol use: Not Currently     Comment: socially   • Drug use: Yes     Types: Marijuana   • Sexual activity: Yes     Partners: Male     Birth control/protection: Condom       ALLERGIES  Penicillins      Current Facility-Administered Medications:   •  dextrose  (D50W) 25 g/ 50mL Intravenous Solution 25 g, 25 g, Intravenous, Once, Vince Pham MD  •  sodium chloride 0.9 % bolus 1,000 mL, 1,000 mL, Intravenous, Once, Vince Pham MD  •  [COMPLETED] Insert peripheral IV, , , Once **AND** sodium chloride 0.9 % flush 10 mL, 10 mL, Intravenous, PRN, Vince Pham MD    Current Outpatient Medications:   •  esomeprazole (nexIUM) 20 MG capsule, Take 20 mg by mouth Every Morning Before Breakfast., Disp: , Rfl:   •  sertraline (Zoloft) 50 MG tablet, Take 1 tablet by mouth Daily., Disp: 30 tablet, Rfl: 2  •  vitamin B-6 (PYRIDOXINE) 25 MG tablet, Take 1 tablet by mouth Every Night., Disp: 30 tablet, Rfl: 3  •  famotidine (Pepcid) 20 MG tablet, Take 1 tablet by mouth 2 (Two) Times a Day., Disp: 60 tablet, Rfl: 3  •  ondansetron ODT (ZOFRAN-ODT) 4 MG disintegrating tablet, Place 4 mg on the tongue Every 8 (Eight) Hours As Needed for Nausea or Vomiting., Disp: , Rfl:     PHYSICAL EXAM  ED Triage Vitals [10/07/20 0649]   Temp Heart Rate Resp BP SpO2   97.2 °F (36.2 °C) 88 12 134/77 98 %      Temp src Heart Rate Source Patient Position BP Location FiO2 (%)   Oral Monitor Sitting Right arm --       Physical Exam  INITIAL VITAL SIGNS: Reviewed by me.  Pulse ox normal  GENERAL: Alert and interactive. No acute distress.  HEAD: Head is normocephalic.  EYES: EOMI. PERRL. No scleral icterus. No conjunctival injection.  ENT: Moist mucous membranes.   NECK: Supple. Full range of motion.  RESPIRATORY: No tachypnea. Clear breath sounds bilaterally. No wheezing. No rales. No rhonchi.  CV: Regular rate and rhythm. No murmurs. No rubs or gallops.  ABDOMEN: Soft, non-distended, non-tender. No guarding. No rebound.  Gravid uterus  BACK:  No obvious deformity.  EXTREMITIES: No deformity. No clubbing or cyanosis. No edema.   SKIN: Warm and dry. No diaphoresis. No obvious rashes.   NEUROLOGIC: Alert and oriented. Face is symmetric. Speech is normal. .       LAB RESULTS    I ordered the above labs  and reviewed the results    RADIOLOGY  No Radiology Exams Resulted Within Past 24 Hours    I ordered the above radiologic testing and reviewed the results    PROCEDURES  Procedures      PROGRESS AND CONSULTS  ED Course as of Oct 07 0823   Wed Oct 07, 2020   0807 The laboratory values were reviewed by me.  Urine microscopic shows 0-2 red blood cells, 3-5 white blood cells, trace bacteria, 7-12 squamous epithelial cells with trace leukocytes.  The LDH is 131, the sodium is 134, and a potassium 3.3, CO2 is 21.  The ALT is 104 and the AST is 43.  The laboratory values are otherwise unremarkable.    [RC]   0809 Results for NAIF CRAIN (MRN [id number]) as of 10/7/2020 08:09    10/5/2020 11:52  Glucose: 78  Sodium: 135 (L)  Potassium: 3.8  CO2: 21.9 (L)  Chloride: 105  Creatinine: 0.62  BUN: 5 (L)  BUN/Creatinine Ratio: 8.1  Calcium: 8.5 (L)  eGFR Non  Am: 119  eGFR  Am: 145  Alkaline Phosphatase: 80  Total Protein: 6.0  ALT (SGPT): 79 (H)  AST (SGOT): 43 (H)  Total Bilirubin: 0.3  Albumin: 3.90  A/G Ratio: 1.9  Globulin: 2.1      [RC]      ED Course User Index  [RC] Armando Baxter MD     08:23 EDT, 10/07/20:  The fetal heart tones are 138.    08:45 EDT, 10/07/20:  The patient was reassessed.  She tolerated clear liquids.  Her vital signs were reviewed and are stable.  Abdominal exam: Soft nontender no masses positive bowel sounds.    08:46 EDT, 10/07/20:  The patient's diagnosis of vomiting of pregnancy, gastroenteritis, transaminitis, and acute UTI were discussed with her.  The patient was given a prescription for Ceftin.  The patient has Zofran at home.  She drank clear liquids for 24 hours and advance diet as tolerated patient should follow-up with [facility] this week.  He should return to the emergency department if she has vomiting not controlled with Zofran, fever, worsening pain, vaginal bleeding.,  Worse in any way at all.  All the patient's questions were answered she will be  discharged in good condition.  The urine has been cultured.      MEDICAL DECISION MAKING      MDM     Well-appearing 25-year-old female at 26 weeks gestation with complaint of vomiting and diarrhea.  She denies any abdominal pain, denies difficulty breathing denies chest pain.  I have discussed the case with Dr. Levy who had seen her on Monday.  Dr. Levy relates that the patient seems to be very concerned about the mild elevations of LFTs.  She does note that the patient has had some poor p.o. intake during her pregnancy and recently had more vomiting which could have led to her mild elevation in LFTs.  At any rate the plan for this patient is to check labs to evaluate and make sure she is not developing hellp, give her fluids with some sugar in it and try to control her vomiting.     Care to Dr. Baxter pending labs, fluids, p.o. challenge.        08:45 EDT, 10/07/20:  The patient was reassessed.  Final diagnoses:   Gastroenteritis   26 weeks gestation of pregnancy   Transaminitis   Acute UTI       Latest Documented Vital Signs:  As of 07:23 EDT  BP- 134/77 HR- 88 Temp- 97.2 °F (36.2 °C) (Oral) O2 sat- 98%    DISPOSITION  Discharge      Discussed pertinent labs and imaging findings with the patient/family.  Patient/Family voiced understanding of need to follow-up for recheck, further testing as needed.  Return to the emergency Department warnings were given.         Medication List      No changes were made to your prescriptions during this visit.                   Dictated utilizing Dragon dictation     Armando Baxter MD  10/07/20 [id number]

## 2020-10-07 NOTE — PROGRESS NOTES
"Pt's mom called the on call line and \"we are not taking her daughter seriously enough\".  She states that the daughter is not eating well and still has nausea and vomiting.  She says that she struggles to get her daughter to eat at least every 12 hours, but it is difficult.  She is very concerned that her daughter is very ill and we are not picking up on that fact.  I did discuss with her that Trista herself does not complain of being extremely sick either in person or on the phone.  The mom says that Trista is under playing her very severe symptoms.  Trista also has very severe anxiety and depression and was on multiple medications prior to pregnancy.  We did discuss that she should see her psychiatrist for evaluation of  anxiety depression to see if this is making her medical issues worse.  Her mom's anxiety is made worse by the fact that she cannot attend her appointments and so is only privy to what Trista tells her at home about what happened.  After a 30-minute conversation, I think the patient's mom felt reassured that we were listening in taking her concerns seriously.  The patient is in the emergency room now being evaluated for nausea and vomiting.  Indu Levy MD    "

## 2020-10-07 NOTE — ED NOTES
PO challenge started at 0811. Pt denies any nausea at this time. Pt drank sprite without complications or nausea at this time.      Peyton Martinez RN  10/07/20 0823

## 2020-10-08 LAB — BACTERIA SPEC AEROBE CULT: NO GROWTH

## 2020-12-01 ENCOUNTER — TELEPHONE (OUTPATIENT)
Dept: OBSTETRICS AND GYNECOLOGY | Facility: CLINIC | Age: 25
End: 2020-12-01

## 2020-12-01 NOTE — TELEPHONE ENCOUNTER
----- Message from Indu Levy MD sent at 10/28/2020 11:22 AM EDT -----  Multiple N/S, can you reach out to patient to have her show up? Thanks AKCALEB

## 2025-07-15 ENCOUNTER — OFFICE VISIT (OUTPATIENT)
Dept: OBSTETRICS AND GYNECOLOGY | Age: 30
End: 2025-07-15
Payer: COMMERCIAL

## 2025-07-15 VITALS
DIASTOLIC BLOOD PRESSURE: 84 MMHG | WEIGHT: 230 LBS | BODY MASS INDEX: 31.15 KG/M2 | HEIGHT: 72 IN | SYSTOLIC BLOOD PRESSURE: 138 MMHG

## 2025-07-15 DIAGNOSIS — Z34.81 ENCOUNTER FOR SUPERVISION OF OTHER NORMAL PREGNANCY IN FIRST TRIMESTER: Primary | ICD-10-CM

## 2025-07-15 DIAGNOSIS — O99.340 ANXIETY DURING PREGNANCY: ICD-10-CM

## 2025-07-15 DIAGNOSIS — Z87.59 HISTORY OF PRIOR PREGNANCY WITH INTRAUTERINE GROWTH RESTRICTED NEWBORN: ICD-10-CM

## 2025-07-15 DIAGNOSIS — F41.9 ANXIETY DURING PREGNANCY: ICD-10-CM

## 2025-07-15 DIAGNOSIS — F32.A DEPRESSION, UNSPECIFIED DEPRESSION TYPE: ICD-10-CM

## 2025-07-15 DIAGNOSIS — O16.2 HYPERTENSION AFFECTING PREGNANCY IN SECOND TRIMESTER: ICD-10-CM

## 2025-07-15 DIAGNOSIS — Z36.8A ENCOUNTER FOR ANTENATAL SCREENING FOR OTHER GENETIC DEFECT: ICD-10-CM

## 2025-07-15 DIAGNOSIS — O09.292 HX OF PREECLAMPSIA, PRIOR PREGNANCY, CURRENTLY PREGNANT, SECOND TRIMESTER: ICD-10-CM

## 2025-07-15 DIAGNOSIS — F41.9 ANXIETY: ICD-10-CM

## 2025-07-15 PROBLEM — H66.90 ACUTE OTITIS MEDIA: Status: RESOLVED | Noted: 2020-05-07 | Resolved: 2025-07-15

## 2025-07-15 PROBLEM — E28.2 PCOS (POLYCYSTIC OVARIAN SYNDROME): Status: RESOLVED | Noted: 2020-10-05 | Resolved: 2025-07-15

## 2025-07-15 PROBLEM — R43.2 LOSS OF TASTE: Status: RESOLVED | Noted: 2020-05-07 | Resolved: 2025-07-15

## 2025-07-15 PROBLEM — R63.0 LOSS OF APPETITE: Status: RESOLVED | Noted: 2020-05-07 | Resolved: 2025-07-15

## 2025-07-15 PROBLEM — O21.9 NAUSEA AND VOMITING DURING PREGNANCY PRIOR TO 22 WEEKS GESTATION: Status: RESOLVED | Noted: 2020-06-23 | Resolved: 2025-07-15

## 2025-07-15 PROBLEM — Z20.822 SUSPECTED COVID-19 VIRUS INFECTION: Status: RESOLVED | Noted: 2020-05-07 | Resolved: 2025-07-15

## 2025-07-15 PROBLEM — R82.71 GBS BACTERIURIA: Status: RESOLVED | Noted: 2020-06-29 | Resolved: 2025-07-15

## 2025-07-15 PROBLEM — A74.9 CHLAMYDIA INFECTION AFFECTING PREGNANCY: Status: RESOLVED | Noted: 2020-06-29 | Resolved: 2025-07-15

## 2025-07-15 PROBLEM — O98.819 CHLAMYDIA INFECTION AFFECTING PREGNANCY: Status: RESOLVED | Noted: 2020-06-29 | Resolved: 2025-07-15

## 2025-07-15 PROBLEM — R82.5 POSITIVE URINE DRUG SCREEN: Status: RESOLVED | Noted: 2020-07-23 | Resolved: 2025-07-15

## 2025-07-15 PROBLEM — R74.8 ELEVATED LIVER ENZYMES: Status: RESOLVED | Noted: 2020-10-06 | Resolved: 2025-07-15

## 2025-07-15 LAB
ALBUMIN SERPL-MCNC: 3.3 G/DL (ref 3.5–5.2)
ALBUMIN/GLOB SERPL: 1.1 G/DL
ALP SERPL-CCNC: 67 U/L (ref 39–117)
ALT SERPL-CCNC: 21 U/L (ref 1–33)
AST SERPL-CCNC: 18 U/L (ref 1–32)
BILIRUB SERPL-MCNC: 0.3 MG/DL (ref 0–1.2)
BUN SERPL-MCNC: 6 MG/DL (ref 6–20)
BUN/CREAT SERPL: 9 (ref 7–25)
CALCIUM SERPL-MCNC: 8.8 MG/DL (ref 8.6–10.5)
CHLORIDE SERPL-SCNC: 106 MMOL/L (ref 98–107)
CO2 SERPL-SCNC: 19.6 MMOL/L (ref 22–29)
CREAT SERPL-MCNC: 0.67 MG/DL (ref 0.57–1)
EGFRCR SERPLBLD CKD-EPI 2021: 123 ML/MIN/1.73
EXTERNAL NIPT: NEGATIVE
GLOBULIN SER CALC-MCNC: 3 GM/DL
GLUCOSE SERPL-MCNC: 78 MG/DL (ref 65–99)
POTASSIUM SERPL-SCNC: 4 MMOL/L (ref 3.5–5.2)
PROT SERPL-MCNC: 6.3 G/DL (ref 6–8.5)
SODIUM SERPL-SCNC: 136 MMOL/L (ref 136–145)

## 2025-07-15 RX ORDER — HYDROXYZINE HYDROCHLORIDE 25 MG/1
25 TABLET, FILM COATED ORAL 3 TIMES DAILY PRN
Qty: 90 TABLET | Refills: 3 | Status: SHIPPED | OUTPATIENT
Start: 2025-07-15

## 2025-07-15 RX ORDER — ASPIRIN 81 MG/1
TABLET ORAL
Qty: 90 TABLET | Refills: 3 | Status: SHIPPED | OUTPATIENT
Start: 2025-07-15

## 2025-07-15 RX ORDER — ONDANSETRON 4 MG/1
4 TABLET, FILM COATED ORAL DAILY PRN
Qty: 30 TABLET | Refills: 1 | Status: SHIPPED | OUTPATIENT
Start: 2025-07-15 | End: 2026-07-15

## 2025-07-15 RX ORDER — BUSPIRONE HYDROCHLORIDE 7.5 MG/1
7.5 TABLET ORAL 2 TIMES DAILY
Qty: 60 TABLET | Refills: 11 | Status: SHIPPED | OUTPATIENT
Start: 2025-07-15

## 2025-07-15 NOTE — PROGRESS NOTES
"Subjective     Chief Complaint   Patient presents with    Gynecologic Exam     Pregnancy Confirmation with US       Trista Lim is a 29 y.o.  whose LMP is Patient's last menstrual period was 2025 (approximate). presents with a new pregnancy. She notes that she has terrible nausea and vomiting alessandra in the morning due to anxiety. She has been on medication that has helped with anxiety but was told by her pcp in early pregnancy to discontinue it.   She has a hx of IUGR in G1 and likely G2 as well. She had preeclampsia in G2. She was dx w cHTN after the pregnancy and was on lisinopril but was stopped    The following portions of the patient's history were reviewed and updated as appropriate:vital signs, allergies, current medications, past medical history, past social history, past surgical history, and problem list      Objective      /84   Ht 182.9 cm (72\")   Wt 104 kg (230 lb)   LMP 2025 (Approximate)   BMI 31.19 kg/m²     Physical Exam  Well,no distress  Regular, nonlabored breathing      TVUS with IUP 15w3d     Assessment & Plan     Diagnoses and all orders for this visit:    1. Encounter for supervision of other normal pregnancy in first trimester (Primary)  -     Urine Culture - Urine, Urine, Clean Catch  -     Chlamydia trachomatis, Neisseria gonorrhoeae, Trichomonas vaginalis, PCR - Urine, Urine, Clean Catch  -     OB Panel With HIV and RPR  -     Varicella Zoster Antibody, IgG  -     Hemoglobin A1c  -     [id number] PLUS Core+SCA+ESS - Blood,    2. Encounter for  screening for other genetic defect  -     [id number] PLUS Core+SCA+ESS - Blood,    3. Hypertension affecting pregnancy in second trimester  -     Comprehensive Metabolic Panel  -     Protein / Creatinine Ratio, Urine - Urine, Clean Catch    4. Anxiety during pregnancy    5. History of prior pregnancy with intrauterine growth restricted     6. Hx of preeclampsia, prior pregnancy, currently pregnant, second " trimester    Other orders  -     ondansetron (Zofran) 4 MG tablet; Take 1 tablet by mouth Daily As Needed for Nausea or Vomiting.  Dispense: 30 tablet; Refill: 1  -     aspirin (ASPIR) 81 MG EC tablet; Take one tablet by mouth for the remainder of pregnancy.  Dispense: 90 tablet; Refill: 3  -     busPIRone (BUSPAR) 7.5 MG tablet; Take 1 tablet by mouth 2 (Two) Times a Day.  Dispense: 60 tablet; Refill: 11  -     hydrOXYzine (ATARAX) 25 MG tablet; Take 1 tablet by mouth 3 (Three) Times a Day As Needed for Anxiety.  Dispense: 90 tablet; Refill: 3      Prenatal labs today  Restart anxiety medications, discussed can be safely used during pregnancy  Start ASA 81 mg   Plan for  testing in third trimester, serial growth US    Return for request del record from [facility] please, 3-4 weeks [facility] anatomy screen please!.      Deanna Linares MD  7/15/2025

## 2025-07-16 LAB
ABO GROUP BLD: NORMAL
BASOPHILS # BLD AUTO: 0 X10E3/UL (ref 0–0.2)
BASOPHILS NFR BLD AUTO: 1 %
BLD GP AB SCN SERPL QL: NEGATIVE
CREAT UR-MCNC: 74.8 MG/DL
EOSINOPHIL # BLD AUTO: 0.1 X10E3/UL (ref 0–0.4)
EOSINOPHIL NFR BLD AUTO: 1 %
ERYTHROCYTE [DISTWIDTH] IN BLOOD BY AUTOMATED COUNT: 12.8 % (ref 11.7–15.4)
HBA1C MFR BLD: 5 % (ref 4.8–5.6)
HBV SURFACE AG SERPL QL IA: NEGATIVE
HCT VFR BLD AUTO: 37.8 % (ref 34–46.6)
HCV AB SERPL QL IA: NON REACTIVE
HCV AB SERPL QL IA: NORMAL
HGB BLD-MCNC: 12.5 G/DL (ref 11.1–15.9)
HIV 1+2 AB+HIV1 P24 AG SERPL QL IA: NON REACTIVE
IMM GRANULOCYTES # BLD AUTO: 0.1 X10E3/UL (ref 0–0.1)
IMM GRANULOCYTES NFR BLD AUTO: 1 %
LYMPHOCYTES # BLD AUTO: 1.7 X10E3/UL (ref 0.7–3.1)
LYMPHOCYTES NFR BLD AUTO: 19 %
MCH RBC QN AUTO: 30.3 PG (ref 26.6–33)
MCHC RBC AUTO-ENTMCNC: 33.1 G/DL (ref 31.5–35.7)
MCV RBC AUTO: 92 FL (ref 79–97)
MONOCYTES # BLD AUTO: 0.5 X10E3/UL (ref 0.1–0.9)
MONOCYTES NFR BLD AUTO: 6 %
NEUTROPHILS # BLD AUTO: 6.2 X10E3/UL (ref 1.4–7)
NEUTROPHILS NFR BLD AUTO: 72 %
OBSERVATION IMP: NORMAL
PLATELET # BLD AUTO: 174 X10E3/UL (ref 150–450)
PROT UR-MCNC: 7 MG/DL
PROT/CREAT UR: 93.6 MG/G CREA (ref 0–200)
RBC # BLD AUTO: 4.13 X10E6/UL (ref 3.77–5.28)
RH BLD: POSITIVE
RPR SER QL: NON REACTIVE
RUBV IGG SERPL IA-ACNC: 3.24 INDEX
VZV IGG SER QL IA: REACTIVE
WBC # BLD AUTO: 8.5 X10E3/UL (ref 3.4–10.8)

## 2025-07-17 ENCOUNTER — PATIENT ROUNDING (BHMG ONLY) (OUTPATIENT)
Dept: OBSTETRICS AND GYNECOLOGY | Age: 30
End: 2025-07-17
Payer: COMMERCIAL

## 2025-07-19 LAB
BACTERIA UR CULT: NORMAL
BACTERIA UR CULT: NORMAL
C TRACH RRNA SPEC QL NAA+PROBE: NEGATIVE
N GONORRHOEA RRNA SPEC QL NAA+PROBE: NEGATIVE
T VAGINALIS RRNA SPEC QL NAA+PROBE: NEGATIVE

## 2025-07-20 LAB
5P15 DELETION (CRI-DU-CHAT): NOT DETECTED
CFDNA.FET/CFDNA.TOTAL SFR FETUS: NORMAL %
CITATION REF LAB TEST: NORMAL
FET 13+18+21+X+Y ANEUP PLAS.CFDNA: NEGATIVE
FET 1P36 DEL RISK WBC.DNA+CFDNA QL: NOT DETECTED
FET 22Q11.2 DEL RISK WBC.DNA+CFDNA QL: NOT DETECTED
FET CHR 11Q23 DEL PLAS.CFDNA QL: NOT DETECTED
FET CHR 15Q11 DEL PLAS.CFDNA QL: NOT DETECTED
FET CHR 21 TS PLAS.CFDNA QL: NEGATIVE
FET CHR 4P16 DEL PLAS.CFDNA QL: NOT DETECTED
FET CHR 8Q24 DEL PLAS.CFDNA QL: NOT DETECTED
FET MS X RISK WBC.DNA+CFDNA QL: NOT DETECTED
FET SEX PLAS.CFDNA DOSAGE CFDNA: NORMAL
FET TS 13 RISK PLAS.CFDNA QL: NEGATIVE
FET TS 18 RISK WBC.DNA+CFDNA QL: NEGATIVE
FET X + Y ANEUP RISK PLAS.CFDNA SEQ-IMP: NOT DETECTED
GA EST FROM CONCEPTION DATE: NORMAL D
GESTATIONAL AGE > 9:: YES
LAB DIRECTOR NAME PROVIDER: NORMAL
LAB DIRECTOR NAME PROVIDER: NORMAL
LABORATORY COMMENT REPORT: NORMAL
LIMITATIONS OF THE TEST: NORMAL
NEGATIVE PREDICTIVE VALUE: NORMAL
PERFORMANCE CHARACTERISTICS: NORMAL
POSITIVE PREDICTIVE VALUE: NORMAL
REF LAB TEST METHOD: NORMAL
SERVICE CMNT-IMP: NORMAL
TEST PERFORMANCE INFO SPEC: NORMAL
TRIOSOMY 16: NOT DETECTED
TRISOMY 22: NOT DETECTED
WRITTEN AUTHORIZATION: NORMAL

## 2025-07-21 ENCOUNTER — TELEPHONE (OUTPATIENT)
Dept: OBSTETRICS AND GYNECOLOGY | Age: 30
End: 2025-07-21
Payer: COMMERCIAL

## 2025-07-21 NOTE — TELEPHONE ENCOUNTER
Pt is calling because she is having sharp pain on her left side which started yesterday afternoon. Pain is happening every 10-15 minutes. No blood. Pt does have an ovarian cyst on that side.    Please adv.

## 2025-07-21 NOTE — TELEPHONE ENCOUNTER
Recommend going to labor and delivery at [facility] for evaluation- needs US and we have none available in the office today

## 2025-08-19 ENCOUNTER — ROUTINE PRENATAL (OUTPATIENT)
Dept: OBSTETRICS AND GYNECOLOGY | Age: 30
End: 2025-08-19
Payer: COMMERCIAL

## 2025-08-19 VITALS — DIASTOLIC BLOOD PRESSURE: 80 MMHG | SYSTOLIC BLOOD PRESSURE: 138 MMHG | BODY MASS INDEX: 31.46 KG/M2 | WEIGHT: 232 LBS

## 2025-08-19 DIAGNOSIS — O16.2 HYPERTENSION AFFECTING PREGNANCY IN SECOND TRIMESTER: ICD-10-CM

## 2025-08-19 DIAGNOSIS — Z13.89 SCREENING FOR BLOOD OR PROTEIN IN URINE: Primary | ICD-10-CM

## 2025-08-19 DIAGNOSIS — B37.31 VULVAL CANDIDIASIS: ICD-10-CM

## 2025-08-19 DIAGNOSIS — Z34.82 ENCOUNTER FOR SUPERVISION OF OTHER NORMAL PREGNANCY IN SECOND TRIMESTER: ICD-10-CM

## 2025-08-19 DIAGNOSIS — R82.998 LEUKOCYTES IN URINE: ICD-10-CM

## 2025-08-19 LAB
BILIRUB BLD-MCNC: NEGATIVE MG/DL
GLUCOSE UR STRIP-MCNC: NEGATIVE MG/DL
GLUCOSE UR STRIP-MCNC: NEGATIVE MG/DL
KETONES UR QL: NEGATIVE
LEUKOCYTE EST, POC: ABNORMAL
NITRITE UR-MCNC: NEGATIVE MG/ML
PH UR: 7 [PH] (ref 5–8)
PROT UR STRIP-MCNC: NEGATIVE MG/DL
PROT UR STRIP-MCNC: NEGATIVE MG/DL
RBC # UR STRIP: ABNORMAL /UL
SP GR UR: 1.02 (ref 1–1.03)
UROBILINOGEN UR QL: ABNORMAL

## 2025-08-19 RX ORDER — LABETALOL 100 MG/1
100 TABLET, FILM COATED ORAL 2 TIMES DAILY
Qty: 180 TABLET | Refills: 3 | Status: SHIPPED | OUTPATIENT
Start: 2025-08-19

## 2025-08-19 RX ORDER — NITROFURANTOIN 25; 75 MG/1; MG/1
100 CAPSULE ORAL 2 TIMES DAILY
Qty: 14 CAPSULE | Refills: 0 | Status: SHIPPED | OUTPATIENT
Start: 2025-08-19

## 2025-08-19 RX ORDER — FLUCONAZOLE 150 MG/1
150 TABLET ORAL ONCE
Qty: 2 TABLET | Refills: 0 | Status: SHIPPED | OUTPATIENT
Start: 2025-08-19 | End: 2025-08-19

## 2025-08-21 LAB
AFP INTERP SERPL-IMP: NORMAL
AFP INTERP SERPL-IMP: NORMAL
AFP MOM SERPL: 0.96
AFP SERPL QL: NORMAL
AFP SERPL-MCNC: 43.8 NG/ML
AGE AT DELIVERY: 28.4 YR
BACTERIA UR CULT: NORMAL
BACTERIA UR CULT: NORMAL
GA METHOD: NORMAL
GA: 20.4 WEEKS
IDDM PATIENT QL: NO
MULTIPLE PREGNANCY: NO
NEURAL TUBE DEFECT RISK FETUS: NORMAL %
SERVICE CMNT-IMP: NORMAL

## 2025-08-26 ENCOUNTER — HOSPITAL ENCOUNTER (EMERGENCY)
Facility: HOSPITAL | Age: 30
Discharge: HOME | End: 2025-08-26
Attending: OBSTETRICS & GYNECOLOGY | Admitting: OBSTETRICS & GYNECOLOGY
Payer: COMMERCIAL

## 2025-08-26 VITALS
RESPIRATION RATE: 16 BRPM | DIASTOLIC BLOOD PRESSURE: 80 MMHG | OXYGEN SATURATION: 100 % | TEMPERATURE: 97.9 F | HEART RATE: 72 BPM | SYSTOLIC BLOOD PRESSURE: 118 MMHG

## 2025-08-26 LAB
A1 MICROGLOB PLACENTAL VAG QL: NEGATIVE
BACTERIA UR QL AUTO: ABNORMAL /HPF
BILIRUB UR QL STRIP: NEGATIVE
CLARITY UR: CLEAR
COLOR UR: YELLOW
GLUCOSE UR STRIP-MCNC: NEGATIVE MG/DL
HGB UR QL STRIP.AUTO: ABNORMAL
HYALINE CASTS UR QL AUTO: ABNORMAL /LPF
KETONES UR QL STRIP: NEGATIVE
LEUKOCYTE ESTERASE UR QL STRIP.AUTO: ABNORMAL
NITRAZINE EXPIRATION DATE: NORMAL
NITRAZINE LOT NUMBER: NORMAL
NITRITE UR QL STRIP: NEGATIVE
PH FLD: 4.5 [PH]
PH UR STRIP.AUTO: 7 [PH] (ref 5–8)
PROT UR QL STRIP: NEGATIVE
RBC # UR STRIP: ABNORMAL /HPF
REF LAB TEST METHOD: ABNORMAL
SP GR UR STRIP: 1.01 (ref 1–1.03)
SQUAMOUS #/AREA URNS HPF: ABNORMAL /HPF
UROBILINOGEN UR QL STRIP: ABNORMAL
WBC # UR STRIP: ABNORMAL /HPF

## 2025-08-26 PROCEDURE — 87086 URINE CULTURE/COLONY COUNT: CPT | Performed by: OBSTETRICS & GYNECOLOGY

## 2025-08-26 PROCEDURE — 84112 EVAL AMNIOTIC FLUID PROTEIN: CPT | Performed by: OBSTETRICS & GYNECOLOGY

## 2025-08-26 PROCEDURE — 99283 EMERGENCY DEPT VISIT LOW MDM: CPT | Performed by: OBSTETRICS & GYNECOLOGY

## 2025-08-26 PROCEDURE — 81001 URINALYSIS AUTO W/SCOPE: CPT | Performed by: OBSTETRICS & GYNECOLOGY

## 2025-08-27 LAB — BACTERIA SPEC AEROBE CULT: NORMAL
